# Patient Record
Sex: FEMALE | Race: WHITE | NOT HISPANIC OR LATINO | Employment: FULL TIME | URBAN - METROPOLITAN AREA
[De-identification: names, ages, dates, MRNs, and addresses within clinical notes are randomized per-mention and may not be internally consistent; named-entity substitution may affect disease eponyms.]

---

## 2017-06-01 ENCOUNTER — OFFICE VISIT (OUTPATIENT)
Dept: FAMILY MEDICINE CLINIC | Facility: CLINIC | Age: 37
End: 2017-06-01

## 2017-06-01 VITALS
SYSTOLIC BLOOD PRESSURE: 114 MMHG | OXYGEN SATURATION: 98 % | RESPIRATION RATE: 16 BRPM | BODY MASS INDEX: 40.32 KG/M2 | HEART RATE: 84 BPM | WEIGHT: 200 LBS | DIASTOLIC BLOOD PRESSURE: 70 MMHG | HEIGHT: 59 IN

## 2017-06-01 DIAGNOSIS — R10.10 PAIN OF UPPER ABDOMEN: Primary | ICD-10-CM

## 2017-06-01 LAB
ALBUMIN SERPL-MCNC: 4.3 G/DL (ref 3.2–4.8)
ALBUMIN/GLOB SERPL: 1.5 G/DL (ref 1.5–2.5)
ALP SERPL-CCNC: 65 U/L (ref 25–100)
ALT SERPL W P-5'-P-CCNC: 26 U/L (ref 7–40)
AMYLASE SERPL-CCNC: 69 U/L (ref 30–118)
ANION GAP SERPL CALCULATED.3IONS-SCNC: 2 MMOL/L (ref 3–11)
ARTICHOKE IGE QN: 193 MG/DL (ref 0–130)
AST SERPL-CCNC: 38 U/L (ref 0–33)
BASOPHILS # BLD AUTO: 0.02 10*3/MM3 (ref 0–0.2)
BASOPHILS NFR BLD AUTO: 0.3 % (ref 0–1)
BILIRUB SERPL-MCNC: 0.5 MG/DL (ref 0.3–1.2)
BUN BLD-MCNC: 9 MG/DL (ref 9–23)
BUN/CREAT SERPL: 12.9 (ref 7–25)
CALCIUM SPEC-SCNC: 10 MG/DL (ref 8.7–10.4)
CHLORIDE SERPL-SCNC: 107 MMOL/L (ref 99–109)
CHOLEST SERPL-MCNC: 247 MG/DL (ref 0–200)
CO2 SERPL-SCNC: 29 MMOL/L (ref 20–31)
CREAT BLD-MCNC: 0.7 MG/DL (ref 0.6–1.3)
DEPRECATED RDW RBC AUTO: 42 FL (ref 37–54)
EOSINOPHIL # BLD AUTO: 0.04 10*3/MM3 (ref 0.1–0.3)
EOSINOPHIL NFR BLD AUTO: 0.6 % (ref 0–3)
ERYTHROCYTE [DISTWIDTH] IN BLOOD BY AUTOMATED COUNT: 12.5 % (ref 11.3–14.5)
GFR SERPL CREATININE-BSD FRML MDRD: 94 ML/MIN/1.73
GLOBULIN UR ELPH-MCNC: 2.9 GM/DL
GLUCOSE BLD-MCNC: 78 MG/DL (ref 70–100)
HCT VFR BLD AUTO: 45 % (ref 34.5–44)
HDLC SERPL-MCNC: 52 MG/DL (ref 40–60)
HGB BLD-MCNC: 15.1 G/DL (ref 11.5–15.5)
IMM GRANULOCYTES # BLD: 0.01 10*3/MM3 (ref 0–0.03)
IMM GRANULOCYTES NFR BLD: 0.2 % (ref 0–0.6)
IRON 24H UR-MRATE: 98 MCG/DL (ref 50–175)
LIPASE SERPL-CCNC: 46 U/L (ref 6–51)
LYMPHOCYTES # BLD AUTO: 2.53 10*3/MM3 (ref 0.6–4.8)
LYMPHOCYTES NFR BLD AUTO: 40.1 % (ref 24–44)
MCH RBC QN AUTO: 30.6 PG (ref 27–31)
MCHC RBC AUTO-ENTMCNC: 33.6 G/DL (ref 32–36)
MCV RBC AUTO: 91.1 FL (ref 80–99)
MONOCYTES # BLD AUTO: 0.47 10*3/MM3 (ref 0–1)
MONOCYTES NFR BLD AUTO: 7.4 % (ref 0–12)
NEUTROPHILS # BLD AUTO: 3.24 10*3/MM3 (ref 1.5–8.3)
NEUTROPHILS NFR BLD AUTO: 51.4 % (ref 41–71)
PLATELET # BLD AUTO: 283 10*3/MM3 (ref 150–450)
PMV BLD AUTO: 12.1 FL (ref 6–12)
POTASSIUM BLD-SCNC: 4.4 MMOL/L (ref 3.5–5.5)
PROT SERPL-MCNC: 7.2 G/DL (ref 5.7–8.2)
RBC # BLD AUTO: 4.94 10*6/MM3 (ref 3.89–5.14)
SODIUM BLD-SCNC: 138 MMOL/L (ref 132–146)
TRIGL SERPL-MCNC: 92 MG/DL (ref 0–150)
TSH SERPL DL<=0.05 MIU/L-ACNC: 0.83 MIU/ML (ref 0.35–5.35)
WBC NRBC COR # BLD: 6.31 10*3/MM3 (ref 3.5–10.8)

## 2017-06-01 PROCEDURE — 36415 COLL VENOUS BLD VENIPUNCTURE: CPT | Performed by: FAMILY MEDICINE

## 2017-06-01 PROCEDURE — 80061 LIPID PANEL: CPT | Performed by: FAMILY MEDICINE

## 2017-06-01 PROCEDURE — 99214 OFFICE O/P EST MOD 30 MIN: CPT | Performed by: FAMILY MEDICINE

## 2017-06-01 PROCEDURE — 85025 COMPLETE CBC W/AUTO DIFF WBC: CPT | Performed by: FAMILY MEDICINE

## 2017-06-01 PROCEDURE — 83690 ASSAY OF LIPASE: CPT | Performed by: FAMILY MEDICINE

## 2017-06-01 PROCEDURE — 83540 ASSAY OF IRON: CPT | Performed by: FAMILY MEDICINE

## 2017-06-01 PROCEDURE — 80053 COMPREHEN METABOLIC PANEL: CPT | Performed by: FAMILY MEDICINE

## 2017-06-01 PROCEDURE — 82150 ASSAY OF AMYLASE: CPT | Performed by: FAMILY MEDICINE

## 2017-06-01 PROCEDURE — 84443 ASSAY THYROID STIM HORMONE: CPT | Performed by: FAMILY MEDICINE

## 2017-06-01 NOTE — PROGRESS NOTES
Subjective   Eun Majano is a 37 y.o. female.     Abdominal Pain   This is a recurrent problem. The current episode started more than 1 month ago. The onset quality is gradual. The problem occurs intermittently. The problem has been gradually worsening. The pain is located in the RUQ and periumbilical region. The pain is at a severity of 7/10. The pain is mild. The quality of the pain is colicky and aching. The abdominal pain radiates to the back. Associated symptoms include constipation and nausea. Pertinent negatives include no arthralgias, diarrhea, dysuria, fever, myalgias or vomiting. The pain is aggravated by eating. The pain is relieved by bowel movements. She has tried nothing for the symptoms. The treatment provided no relief. There is no history of abdominal surgery.        The following portions of the patient's history were reviewed and updated as appropriate: allergies, current medications, past family history, past medical history, past social history, past surgical history and problem list.    Review of Systems   Constitutional: Negative for appetite change, chills, diaphoresis, fatigue, fever and unexpected weight change.   HENT: Negative for congestion, ear pain, mouth sores, postnasal drip, rhinorrhea, sinus pressure, sneezing, sore throat and trouble swallowing.    Eyes: Negative for pain, redness and visual disturbance.   Respiratory: Negative for apnea, cough, chest tightness, shortness of breath and wheezing.    Cardiovascular: Negative for chest pain, palpitations and leg swelling.   Gastrointestinal: Positive for abdominal pain, constipation and nausea. Negative for abdominal distention, blood in stool, diarrhea and vomiting.   Endocrine: Negative for cold intolerance, polydipsia, polyphagia and polyuria.   Genitourinary: Negative for difficulty urinating, dysuria, enuresis, flank pain and urgency.   Musculoskeletal: Negative for arthralgias, back pain, joint swelling, myalgias and neck  pain.   Skin: Negative for color change, rash and wound.   Neurological: Negative for dizziness, syncope, weakness, light-headedness and numbness.   Hematological: Negative for adenopathy.   Psychiatric/Behavioral: Negative for agitation, behavioral problems and confusion. The patient is not nervous/anxious.        Objective   Physical Exam   Constitutional: She is oriented to person, place, and time. She appears well-developed and well-nourished. No distress.   HENT:   Right Ear: External ear normal.   Left Ear: External ear normal.   Nose: Nose normal.   Mouth/Throat: Oropharynx is clear and moist.   Eyes: Conjunctivae and EOM are normal. Pupils are equal, round, and reactive to light.   Neck: Normal range of motion. Neck supple. No thyromegaly present.   Cardiovascular: Normal rate, regular rhythm and normal heart sounds.    No murmur heard.  Pulmonary/Chest: Effort normal and breath sounds normal. No respiratory distress. She has no wheezes.   Abdominal: Soft. Bowel sounds are normal. She exhibits no distension and no mass. There is no tenderness. There is no rebound and no guarding. No hernia.   Musculoskeletal: Normal range of motion. She exhibits no edema or tenderness.   Lymphadenopathy:     She has no cervical adenopathy.   Neurological: She is alert and oriented to person, place, and time. She has normal reflexes.   Skin: Skin is warm and dry. No rash noted. She is not diaphoretic. No erythema. No pallor.   Psychiatric: She has a normal mood and affect. Her behavior is normal. Judgment and thought content normal.   Nursing note and vitals reviewed.      Assessment/Plan   Eun was seen today for back pain and abdominal pain.    Diagnoses and all orders for this visit:    Pain of upper abdomen  -     CBC & Differential  -     Comprehensive Metabolic Panel  -     Iron  -     Lipid Panel  -     TSH  -     Amylase  -     Lipase  -     US Abdomen Complete; Future  -     CBC Auto Differential        I  personally spent over half of a total 25 minutes face to face with the patient in counseling and discussion and/or coordination of care as described above.

## 2017-06-08 ENCOUNTER — HOSPITAL ENCOUNTER (OUTPATIENT)
Dept: ULTRASOUND IMAGING | Facility: HOSPITAL | Age: 37
Discharge: HOME | End: 2017-06-08
Attending: FAMILY MEDICINE | Admitting: FAMILY MEDICINE

## 2017-06-08 DIAGNOSIS — R10.10 PAIN OF UPPER ABDOMEN: ICD-10-CM

## 2017-06-08 PROCEDURE — 76700 US EXAM ABDOM COMPLETE: CPT

## 2017-06-15 ENCOUNTER — TELEPHONE (OUTPATIENT)
Dept: FAMILY MEDICINE CLINIC | Facility: CLINIC | Age: 37
End: 2017-06-15

## 2017-06-15 NOTE — TELEPHONE ENCOUNTER
----- Message from Esther Spencer DO sent at 6/13/2017  7:44 AM EDT -----  Regarding: FW: Pt rosanna   Contact: [phone]   She has an appointment 6/29. If she needs to be seen sooner than this then that is fine. She may need GI referral.  ----- Message -----     From: Ruth Mejia MA     Sent: 6/12/2017   3:28 PM       To: Esther Spencer DO  Subject: FW: Pt rosanna                                  She wants to know when she needs to follow up with the Abdominal aorta , also she states that she is still having these issues with abdominal pain , and wants to know what the next steps are .   ----- Message -----     From: Esther Spencer DO     Sent: 6/12/2017   2:07 PM       To: Ruth Mejia MA  Subject: FW: Pt rosanna                                  Very slightly enlarged abdominal aorta otherwise scan is unremarkable.   ----- Message -----     From: Ruth Mejia MA     Sent: 6/12/2017   1:36 PM       To: Esther Spencer DO  Subject: FW: Pt rosanna                                      ----- Message -----     From: Rebeka Foy     Sent: 6/12/2017   1:22 PM       To: Ruth Mejia MA  Subject: Pt rosanna                                      Pt called and wants a call to get results of and to discuss her recent Ultrasound

## 2017-11-03 ENCOUNTER — OFFICE VISIT (OUTPATIENT)
Dept: FAMILY MEDICINE CLINIC | Facility: CLINIC | Age: 37
End: 2017-11-03

## 2017-11-03 VITALS
BODY MASS INDEX: 39.31 KG/M2 | DIASTOLIC BLOOD PRESSURE: 70 MMHG | HEIGHT: 59 IN | SYSTOLIC BLOOD PRESSURE: 110 MMHG | RESPIRATION RATE: 16 BRPM | HEART RATE: 80 BPM | OXYGEN SATURATION: 98 % | WEIGHT: 195 LBS

## 2017-11-03 DIAGNOSIS — J06.9 ACUTE URI: Primary | ICD-10-CM

## 2017-11-03 DIAGNOSIS — J40 BRONCHITIS: ICD-10-CM

## 2017-11-03 PROCEDURE — 99213 OFFICE O/P EST LOW 20 MIN: CPT | Performed by: FAMILY MEDICINE

## 2017-11-03 PROCEDURE — 94010 BREATHING CAPACITY TEST: CPT | Performed by: FAMILY MEDICINE

## 2017-11-03 RX ORDER — ALBUTEROL SULFATE 90 UG/1
2 AEROSOL, METERED RESPIRATORY (INHALATION) EVERY 4 HOURS PRN
Qty: 1 INHALER | Refills: 1 | Status: SHIPPED | OUTPATIENT
Start: 2017-11-03 | End: 2018-01-05

## 2017-11-03 RX ORDER — AZITHROMYCIN 250 MG/1
TABLET, FILM COATED ORAL
Qty: 6 TABLET | Refills: 0 | Status: SHIPPED | OUTPATIENT
Start: 2017-11-03 | End: 2017-11-17

## 2017-11-03 RX ORDER — GUAIFENESIN AND CODEINE PHOSPHATE 100; 10 MG/5ML; MG/5ML
5 SOLUTION ORAL 3 TIMES DAILY PRN
Qty: 118 ML | Refills: 0 | Status: SHIPPED | OUTPATIENT
Start: 2017-11-03 | End: 2018-01-05

## 2017-11-03 RX ORDER — AZELASTINE 1 MG/ML
2 SPRAY, METERED NASAL 2 TIMES DAILY
Qty: 1 EACH | Refills: 2 | Status: SHIPPED | OUTPATIENT
Start: 2017-11-03 | End: 2018-01-05

## 2017-11-03 NOTE — PROGRESS NOTES
Subjective   Eun Majano is a 37 y.o. female.     Cough   This is a recurrent problem. The current episode started more than 1 month ago. The problem has been unchanged. The problem occurs constantly. Associated symptoms include nasal congestion, postnasal drip, rhinorrhea, shortness of breath and wheezing. Pertinent negatives include no fever, sore throat or weight loss. Nothing aggravates the symptoms. 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 Treatments tried: nasal decongestants and albuterol. The treatment provided mild relief. Her past medical history is significant for asthma.        The following portions of the patient's history were reviewed and updated as appropriate: allergies, current medications, past family history, past medical history, past social history, past surgical history and problem list.    Review of Systems   Constitutional: Negative.  Negative for fever and weight loss.   HENT: Positive for postnasal drip and rhinorrhea. Negative for sore throat.    Eyes: Negative.    Respiratory: Positive for cough, shortness of breath and wheezing.    Cardiovascular: Negative.    Gastrointestinal: Negative.    Endocrine: Negative.    Genitourinary: Negative.    Musculoskeletal: Negative.    Skin: Negative.    Allergic/Immunologic: Negative.    Neurological: Negative.    Hematological: Negative.    Psychiatric/Behavioral: Negative.    All other systems reviewed and are negative.      Objective   Physical Exam   Constitutional: She is oriented to person, place, and time. She appears well-developed and well-nourished. No distress.   HENT:   Right Ear: External ear normal.   Left Ear: External ear normal.   Nose: Nose normal.   Mouth/Throat: Oropharynx is clear and moist.   Eyes: Conjunctivae and EOM are normal. Pupils are equal, round, and reactive to light.   Neck: Normal range of motion. Neck supple. No thyromegaly present.   Cardiovascular: Normal rate, regular rhythm and normal heart sounds.    No murmur  heard.  Pulmonary/Chest: Effort normal and breath sounds normal. No respiratory distress. She has no wheezes.   Abdominal: Soft. Bowel sounds are normal. She exhibits no distension and no mass. There is no tenderness. There is no rebound and no guarding. No hernia.   Musculoskeletal: Normal range of motion. She exhibits no edema or tenderness.   Lymphadenopathy:     She has no cervical adenopathy.   Neurological: She is alert and oriented to person, place, and time. She has normal reflexes.   Skin: Skin is warm and dry. No rash noted. She is not diaphoretic. No erythema. No pallor.   Psychiatric: She has a normal mood and affect. Her behavior is normal. Judgment and thought content normal.   Nursing note and vitals reviewed.  Spirometry: %, FEV1 108%, small airways normal    Assessment/Plan   Eun was seen today for cough and uri.    Diagnoses and all orders for this visit:    Acute URI    Bronchitis  -     Breathing Capacity Test    Other orders  -     azithromycin (ZITHROMAX) 250 MG tablet; Take 2 tablets the first day, then 1 tablet daily for 4 days.  -     albuterol (PROVENTIL HFA;VENTOLIN HFA) 108 (90 Base) MCG/ACT inhaler; Inhale 2 puffs Every 4 (Four) Hours As Needed for Wheezing.  -     guaifenesin-codeine (GUAIFENESIN AC) 100-10 MG/5ML liquid; Take 5 mL by mouth 3 (Three) Times a Day As Needed for Cough.  -     azelastine (ASTELIN) 0.1 % nasal spray; 2 sprays into each nostril 2 (Two) Times a Day. Use in each nostril as directed        I personally spent over half of a total 25 minutes face to face with the patient in counseling and discussion and/or coordination of care as described above.

## 2017-11-17 ENCOUNTER — OFFICE VISIT (OUTPATIENT)
Dept: FAMILY MEDICINE CLINIC | Facility: CLINIC | Age: 37
End: 2017-11-17

## 2017-11-17 VITALS
OXYGEN SATURATION: 98 % | HEIGHT: 59 IN | TEMPERATURE: 98.6 F | DIASTOLIC BLOOD PRESSURE: 80 MMHG | BODY MASS INDEX: 39.72 KG/M2 | HEART RATE: 62 BPM | WEIGHT: 197 LBS | RESPIRATION RATE: 18 BRPM | SYSTOLIC BLOOD PRESSURE: 120 MMHG

## 2017-11-17 DIAGNOSIS — R05.9 COUGH: ICD-10-CM

## 2017-11-17 DIAGNOSIS — J30.2 CHRONIC SEASONAL ALLERGIC RHINITIS, UNSPECIFIED TRIGGER: ICD-10-CM

## 2017-11-17 DIAGNOSIS — J20.9 ACUTE BRONCHITIS, UNSPECIFIED ORGANISM: Primary | ICD-10-CM

## 2017-11-17 PROCEDURE — 99213 OFFICE O/P EST LOW 20 MIN: CPT | Performed by: PHYSICIAN ASSISTANT

## 2017-11-17 RX ORDER — LEVOFLOXACIN 500 MG/1
500 TABLET, FILM COATED ORAL DAILY
Qty: 7 TABLET | Refills: 0 | Status: SHIPPED | OUTPATIENT
Start: 2017-11-17 | End: 2018-01-05

## 2017-11-17 RX ORDER — MONTELUKAST SODIUM 10 MG/1
10 TABLET ORAL NIGHTLY
Qty: 30 TABLET | Refills: 5 | Status: SHIPPED | OUTPATIENT
Start: 2017-11-17 | End: 2018-05-09

## 2017-11-17 RX ORDER — PREDNISONE 10 MG/1
TABLET ORAL
Qty: 21 TABLET | Refills: 0 | Status: SHIPPED | OUTPATIENT
Start: 2017-11-17 | End: 2018-01-05

## 2017-11-17 NOTE — PROGRESS NOTES
Subjective   Eun Majano is a 37 y.o. female.     Cough   This is a new problem. The current episode started more than 1 month ago. The problem has been unchanged. The problem occurs every few minutes. The cough is productive of sputum. Associated symptoms include ear congestion, headaches, nasal congestion, postnasal drip, rhinorrhea, a sore throat and wheezing. Pertinent negatives include no chest pain, chills, ear pain, fever, heartburn, hemoptysis, myalgias, rash, shortness of breath, sweats or weight loss. The symptoms are aggravated by lying down. She has tried a beta-agonist inhaler, OTC cough suppressant, prescription cough suppressant and rest (cough and cold prep ) for the symptoms. The treatment provided no relief. Her past medical history is significant for environmental allergies. possible asthma history    bad seasonal allergies . Taken zyrtec daily X 3 years.   Albuterol inhaler does seem to help some with cough  Cough worse when laying down at night, but happens all day   Sinuses still bothering her a lot   Azithromycin did not help (was given this two weeks ago)   Travels to many different schools. Some have black mold problems.     The following portions of the patient's history were reviewed and updated as appropriate: allergies, current medications, past family history, past medical history, past social history, past surgical history and problem list.    Review of Systems   Constitutional: Positive for fatigue. Negative for chills, diaphoresis, fever and weight loss.   HENT: Positive for postnasal drip, rhinorrhea, sinus pressure, sore throat and tinnitus. Negative for congestion, ear discharge, ear pain, hearing loss, nosebleeds, sinus pain and sneezing.    Eyes: Negative.    Respiratory: Positive for cough and wheezing. Negative for hemoptysis, chest tightness and shortness of breath.    Cardiovascular: Negative.  Negative for chest pain, palpitations and leg swelling.   Gastrointestinal:  "Negative for abdominal distention, abdominal pain, anal bleeding, blood in stool, constipation, diarrhea, heartburn, nausea, rectal pain and vomiting.   Endocrine: Negative.  Negative for cold intolerance, heat intolerance, polydipsia, polyphagia and polyuria.   Genitourinary: Negative.  Negative for difficulty urinating, dysuria, flank pain, frequency, hematuria and urgency.   Musculoskeletal: Negative.  Negative for arthralgias, back pain, gait problem, joint swelling, myalgias, neck pain and neck stiffness.   Skin: Negative.  Negative for color change, pallor, rash and wound.   Allergic/Immunologic: Positive for environmental allergies. Negative for immunocompromised state.   Neurological: Positive for headaches. Negative for dizziness, syncope, weakness, light-headedness and numbness.   Hematological: Negative.  Negative for adenopathy. Does not bruise/bleed easily.   Psychiatric/Behavioral: Negative.  Negative for behavioral problems, confusion, self-injury, sleep disturbance and suicidal ideas. The patient is not nervous/anxious.        Objective    Blood pressure 120/80, pulse 62, temperature 98.6 °F (37 °C), temperature source Oral, resp. rate 18, height 59\" (149.9 cm), weight 197 lb (89.4 kg), last menstrual period 11/07/2017, SpO2 98 %, unknown if currently breastfeeding.     Physical Exam   Constitutional: She is oriented to person, place, and time. She appears well-developed and well-nourished.   HENT:   Head: Normocephalic and atraumatic.   Right Ear: External ear and ear canal normal. Tympanic membrane is retracted. Tympanic membrane is not perforated, not erythematous and not bulging.   Left Ear: External ear and ear canal normal. Tympanic membrane is retracted. Tympanic membrane is not perforated, not erythematous and not bulging.   Nose: Mucosal edema and rhinorrhea present. Right sinus exhibits no maxillary sinus tenderness and no frontal sinus tenderness. Left sinus exhibits no maxillary sinus " tenderness and no frontal sinus tenderness.   Mouth/Throat: Posterior oropharyngeal erythema present. No oropharyngeal exudate or posterior oropharyngeal edema.   Eyes: Conjunctivae and EOM are normal. Pupils are equal, round, and reactive to light.   Neck: Normal range of motion. Neck supple. No tracheal deviation present. No thyromegaly present.   Cardiovascular: Normal rate, regular rhythm, normal heart sounds and intact distal pulses.  Exam reveals no gallop and no friction rub.    No murmur heard.  Pulmonary/Chest: Effort normal and breath sounds normal. No respiratory distress. She has no wheezes. She has no rales. She exhibits no tenderness.   Abdominal: Soft. Bowel sounds are normal. She exhibits no distension and no mass. There is no tenderness. There is no rebound and no guarding. No hernia.   Musculoskeletal: Normal range of motion. She exhibits no edema, tenderness or deformity.   Lymphadenopathy:     She has no cervical adenopathy.   Neurological: She is alert and oriented to person, place, and time. She has normal reflexes.   Skin: Skin is warm and dry.   Psychiatric: She has a normal mood and affect. Her behavior is normal. Judgment and thought content normal.   Nursing note and vitals reviewed.      Assessment/Plan   Eun was seen today for follow-up.    Diagnoses and all orders for this visit:    Acute bronchitis, unspecified organism  -     predniSONE (DELTASONE) 10 MG tablet; Take 60 mg po day 1, 50 mg po day 2, 40 mg po day 3, 30 mg po day 4, 20 mg po day 5, then take 10 mg po day 6  -     levoFLOXacin (LEVAQUIN) 500 MG tablet; Take 1 tablet by mouth Daily.    Cough  -     montelukast (SINGULAIR) 10 MG tablet; Take 1 tablet by mouth Every Night.    Chronic seasonal allergic rhinitis, unspecified trigger    believe symptoms are more related to environmental allergens or possible underlying asthma. Steroid taper and Singulair as directed. May continue astelin and albuterol inhaler. Encourage her  to try switching from zyrtec to Claritin, allegra or xyzal since she has been on zyrtec for many years with little relief.   Hold off on antibiotic at this time. May begin in the next 6 days if symptoms not improving with conservative approaches. Pt agrees.   If still no improvement, suggest trial of PPI in the future to rule out occult reflux     Pt has questions about colonoscopy. No family hx of colon cancer or polyps but considering a colonoscopy. Had right sided flank pain for months, nothing showed up on images. Will also get constipation from time to time. Encouraged patient to discuss this with her PCP

## 2018-01-05 ENCOUNTER — OFFICE VISIT (OUTPATIENT)
Dept: FAMILY MEDICINE CLINIC | Facility: CLINIC | Age: 38
End: 2018-01-05

## 2018-01-05 VITALS
HEART RATE: 71 BPM | BODY MASS INDEX: 39.72 KG/M2 | DIASTOLIC BLOOD PRESSURE: 70 MMHG | SYSTOLIC BLOOD PRESSURE: 110 MMHG | TEMPERATURE: 99.3 F | WEIGHT: 197 LBS | OXYGEN SATURATION: 96 % | HEIGHT: 59 IN | RESPIRATION RATE: 16 BRPM

## 2018-01-05 DIAGNOSIS — R07.9 CHEST PAIN, UNSPECIFIED TYPE: Primary | ICD-10-CM

## 2018-01-05 PROCEDURE — 99214 OFFICE O/P EST MOD 30 MIN: CPT | Performed by: NURSE PRACTITIONER

## 2018-01-05 PROCEDURE — 93000 ELECTROCARDIOGRAM COMPLETE: CPT | Performed by: NURSE PRACTITIONER

## 2018-01-05 NOTE — PROGRESS NOTES
Subjective   Eun Majano is a 37 y.o. female.     History of Present Illness Ms Majano complains of worsening chest pain for one week. She was instructed to go to the ER when she called for an appt but chose to come to the office instead. States over the last 6 months had two earlier episodes of chest pain. Described as tightening and pressure like sensation in central chest that radiated to her right arm. Denies palpitations, sob, nausea, diaphoresis or pre-syncope. Not aggravated by exercise or food. Not alleviated by TUMS or rest. Pain can be 7/10. Was able to sleep last night but woke up this am with pain 4/10. No new repetitive motions. She does have two children.   Risk factors include obesity, sedentary lifestyle and hyperlipidemia.    The following portions of the patient's history were reviewed and updated as appropriate: allergies, current medications, past family history, past medical history, past social history, past surgical history and problem list.    Review of Systems   Constitutional: Negative for fatigue, fever and unexpected weight change.   HENT: Negative for congestion, hearing loss, nosebleeds, rhinorrhea, sore throat, trouble swallowing and voice change.    Eyes: Negative for pain, discharge, redness and visual disturbance.   Respiratory: Negative for cough, chest tightness, shortness of breath and wheezing.    Cardiovascular: Positive for chest pain. Negative for palpitations and leg swelling.   Gastrointestinal: Negative for abdominal distention, abdominal pain, anal bleeding, blood in stool, constipation, diarrhea, nausea and vomiting.   Endocrine: Negative for cold intolerance, heat intolerance, polydipsia, polyphagia and polyuria.   Genitourinary: Negative for dysuria, flank pain, frequency and hematuria.   Musculoskeletal: Negative for arthralgias, gait problem, joint swelling and myalgias.   Skin: Negative for color change and rash.   Neurological: Negative for dizziness,  tremors, seizures, syncope, speech difficulty, weakness, numbness and headaches.   Hematological: Negative.    Psychiatric/Behavioral: Negative.        Objective   Physical Exam   Constitutional: She is oriented to person, place, and time. She appears well-developed and well-nourished. No distress.   HENT:   Head: Normocephalic and atraumatic.   Right Ear: Tympanic membrane and external ear normal.   Left Ear: Tympanic membrane and external ear normal.   Nose: Nose normal.   Mouth/Throat: Oropharynx is clear and moist. No oropharyngeal exudate.   Eyes: Conjunctivae are normal. Pupils are equal, round, and reactive to light. Right eye exhibits no discharge. Left eye exhibits no discharge. No scleral icterus.   Neck: Neck supple. No tracheal deviation present. No thyromegaly present.   Cardiovascular: Normal rate, regular rhythm and normal heart sounds.  Exam reveals no gallop and no friction rub.    No murmur heard.  Cannot reproduce her chest pain with palpation   Pulmonary/Chest: Effort normal and breath sounds normal. No respiratory distress. She has no wheezes.   Abdominal: Soft. Bowel sounds are normal. She exhibits no distension and no mass. There is no tenderness.   Musculoskeletal: She exhibits no edema or deformity.   Lymphadenopathy:     She has no cervical adenopathy.   Neurological: She is alert and oriented to person, place, and time. Coordination normal.   Skin: Skin is warm and dry. No rash noted. No erythema.   Psychiatric: She has a normal mood and affect. Her speech is normal and behavior is normal. Judgment and thought content normal.   Nursing note and vitals reviewed.        ECG 12 Lead  Date/Time: 1/5/2018 12:36 PM  Performed by: STEVEN MUÑOZ  Authorized by: STEVEN MUÑOZ   Comparison: not compared with previous ECG   Previous ECG: no previous ECG available  Rhythm: sinus rhythm  Rate: normal  Conduction: conduction normal  ST Segments: ST segments normal  T Waves: T waves  normal  QRS axis: normal  Clinical impression: normal ECG  Comments: Poor quality with artifact            Eun was seen today for chest pain.    Diagnoses and all orders for this visit:    Chest pain, unspecified type    Other orders  -     Cancel: Tdap Vaccine Greater Than or Equal To 6yo IM    Discussed that she needed to be evaluated in ER with serial troponins and ekg's. Patient chose to drive herself to [facility] ER, did not want to go by EMS. Report called to [facility] ER. Will have her follow up with PCP after ER visit. Anticipate stress test.  I personally spent over half of a total 25 minutes face to face with the patient in counseling and discussion and/or coordination of care as described above.

## 2018-05-09 ENCOUNTER — OFFICE VISIT (OUTPATIENT)
Dept: FAMILY MEDICINE CLINIC | Facility: CLINIC | Age: 38
End: 2018-05-09

## 2018-05-09 ENCOUNTER — TELEPHONE (OUTPATIENT)
Dept: FAMILY MEDICINE CLINIC | Facility: CLINIC | Age: 38
End: 2018-05-09

## 2018-05-09 VITALS
RESPIRATION RATE: 18 BRPM | BODY MASS INDEX: 39.51 KG/M2 | OXYGEN SATURATION: 97 % | DIASTOLIC BLOOD PRESSURE: 74 MMHG | SYSTOLIC BLOOD PRESSURE: 118 MMHG | WEIGHT: 196 LBS | HEART RATE: 71 BPM | HEIGHT: 59 IN

## 2018-05-09 DIAGNOSIS — Z00.00 HEALTHCARE MAINTENANCE: Primary | ICD-10-CM

## 2018-05-09 DIAGNOSIS — L73.9 FOLLICULITIS: ICD-10-CM

## 2018-05-09 DIAGNOSIS — E78.2 MIXED HYPERLIPIDEMIA: ICD-10-CM

## 2018-05-09 LAB
ALBUMIN SERPL-MCNC: 4.2 G/DL (ref 3.2–4.8)
ALBUMIN/GLOB SERPL: 1.6 G/DL (ref 1.5–2.5)
ALP SERPL-CCNC: 59 U/L (ref 25–100)
ALT SERPL W P-5'-P-CCNC: 19 U/L (ref 7–40)
ANION GAP SERPL CALCULATED.3IONS-SCNC: 7 MMOL/L (ref 3–11)
ARTICHOKE IGE QN: 157 MG/DL (ref 0–130)
AST SERPL-CCNC: 20 U/L (ref 0–33)
BASOPHILS # BLD AUTO: 0.05 10*3/MM3 (ref 0–0.2)
BASOPHILS NFR BLD AUTO: 0.9 % (ref 0–1)
BILIRUB SERPL-MCNC: 0.5 MG/DL (ref 0.3–1.2)
BUN BLD-MCNC: 11 MG/DL (ref 9–23)
BUN/CREAT SERPL: 15.7 (ref 7–25)
CALCIUM SPEC-SCNC: 9.1 MG/DL (ref 8.7–10.4)
CHLORIDE SERPL-SCNC: 107 MMOL/L (ref 99–109)
CHOLEST SERPL-MCNC: 211 MG/DL (ref 0–200)
CO2 SERPL-SCNC: 24 MMOL/L (ref 20–31)
CREAT BLD-MCNC: 0.7 MG/DL (ref 0.6–1.3)
DEPRECATED RDW RBC AUTO: 41.6 FL (ref 37–54)
EOSINOPHIL # BLD AUTO: 0.08 10*3/MM3 (ref 0–0.3)
EOSINOPHIL NFR BLD AUTO: 1.5 % (ref 0–3)
ERYTHROCYTE [DISTWIDTH] IN BLOOD BY AUTOMATED COUNT: 12.8 % (ref 11.3–14.5)
GFR SERPL CREATININE-BSD FRML MDRD: 94 ML/MIN/1.73
GLOBULIN UR ELPH-MCNC: 2.6 GM/DL
GLUCOSE BLD-MCNC: 83 MG/DL (ref 70–100)
HBA1C MFR BLD: 4.9 %
HCT VFR BLD AUTO: 43.8 % (ref 34.5–44)
HDLC SERPL-MCNC: 50 MG/DL (ref 40–60)
HGB BLD-MCNC: 14.8 G/DL (ref 11.5–15.5)
IMM GRANULOCYTES # BLD: 0.01 10*3/MM3 (ref 0–0.03)
IMM GRANULOCYTES NFR BLD: 0.2 % (ref 0–0.6)
LYMPHOCYTES # BLD AUTO: 2.74 10*3/MM3 (ref 0.6–4.8)
LYMPHOCYTES NFR BLD AUTO: 50 % (ref 24–44)
MCH RBC QN AUTO: 30.1 PG (ref 27–31)
MCHC RBC AUTO-ENTMCNC: 33.8 G/DL (ref 32–36)
MCV RBC AUTO: 89.2 FL (ref 80–99)
MONOCYTES # BLD AUTO: 0.46 10*3/MM3 (ref 0–1)
MONOCYTES NFR BLD AUTO: 8.4 % (ref 0–12)
NEUTROPHILS # BLD AUTO: 2.15 10*3/MM3 (ref 1.5–8.3)
NEUTROPHILS NFR BLD AUTO: 39.2 % (ref 41–71)
PLATELET # BLD AUTO: 299 10*3/MM3 (ref 150–450)
PMV BLD AUTO: 11.9 FL (ref 6–12)
POTASSIUM BLD-SCNC: 4.5 MMOL/L (ref 3.5–5.5)
PROT SERPL-MCNC: 6.8 G/DL (ref 5.7–8.2)
RBC # BLD AUTO: 4.91 10*6/MM3 (ref 3.89–5.14)
SODIUM BLD-SCNC: 138 MMOL/L (ref 132–146)
TRIGL SERPL-MCNC: 116 MG/DL (ref 0–150)
TSH SERPL DL<=0.05 MIU/L-ACNC: 0.76 MIU/ML (ref 0.35–5.35)
WBC NRBC COR # BLD: 5.48 10*3/MM3 (ref 3.5–10.8)

## 2018-05-09 PROCEDURE — 84443 ASSAY THYROID STIM HORMONE: CPT | Performed by: FAMILY MEDICINE

## 2018-05-09 PROCEDURE — 80061 LIPID PANEL: CPT | Performed by: FAMILY MEDICINE

## 2018-05-09 PROCEDURE — 99214 OFFICE O/P EST MOD 30 MIN: CPT | Performed by: FAMILY MEDICINE

## 2018-05-09 PROCEDURE — 83036 HEMOGLOBIN GLYCOSYLATED A1C: CPT | Performed by: FAMILY MEDICINE

## 2018-05-09 PROCEDURE — 36415 COLL VENOUS BLD VENIPUNCTURE: CPT | Performed by: FAMILY MEDICINE

## 2018-05-09 PROCEDURE — 85025 COMPLETE CBC W/AUTO DIFF WBC: CPT | Performed by: FAMILY MEDICINE

## 2018-05-09 PROCEDURE — 80053 COMPREHEN METABOLIC PANEL: CPT | Performed by: FAMILY MEDICINE

## 2018-05-09 RX ORDER — SULFAMETHOXAZOLE AND TRIMETHOPRIM 800; 160 MG/1; MG/1
TABLET ORAL
Refills: 0 | COMMUNITY
Start: 2018-05-04 | End: 2018-10-01

## 2018-05-09 RX ORDER — MUPIROCIN CALCIUM 20 MG/G
CREAM TOPICAL 3 TIMES DAILY
Qty: 30 G | Refills: 1 | Status: SHIPPED | OUTPATIENT
Start: 2018-05-09 | End: 2018-10-01

## 2018-05-09 NOTE — TELEPHONE ENCOUNTER
PHARMACY CALLED TO SEE IF THEY COULD CHANGE THE mupirocin (BACTROBAN) 2 % cream TO THE OINTMENT DUE TO HAVING IT IN STOCK.     PLEASE ADVISE.     CALL BRIGITTE IF THIS IS OKAY.

## 2018-05-09 NOTE — PROGRESS NOTES
Subjective   Eun Majano is a 38 y.o. female.     History of Present Illness   Pt having recurrent boils on hands and legs and abdomen. Has been to [facility] and given Bactrim with little relief.Pt works at [employer] but has to go in to schools everyday and concerned that she may be coming in contact with something.  Pt concerned that she needs checked for diabetes. The following portions of the patient's history were reviewed and updated as appropriate: allergies, current medications, past family history, past medical history, past social history, past surgical history and problem list.    Review of Systems   Constitutional: Negative.  Negative for activity change, fatigue, fever, unexpected weight gain and unexpected weight loss.   HENT: Negative.  Negative for congestion, sneezing and sore throat.    Eyes: Negative.  Negative for blurred vision, double vision and visual disturbance.   Respiratory: Negative.  Negative for cough, chest tightness, shortness of breath and wheezing.    Cardiovascular: Negative.  Negative for chest pain, palpitations and leg swelling.   Gastrointestinal: Negative.  Negative for abdominal distention, abdominal pain, blood in stool, constipation, diarrhea and nausea.   Endocrine: Negative.  Negative for cold intolerance and heat intolerance.   Genitourinary: Negative.  Negative for urinary incontinence, dysuria, frequency and urgency.   Musculoskeletal: Negative.  Negative for arthralgias and myalgias.   Skin: Negative.  Negative for rash.   Allergic/Immunologic: Negative.    Neurological: Negative.  Negative for dizziness, syncope, numbness and memory problem.   Hematological: Negative.  Negative for adenopathy.   Psychiatric/Behavioral: Negative.  Negative for suicidal ideas and depressed mood. The patient is not nervous/anxious.    All other systems reviewed and are negative.      Objective   Physical Exam   Constitutional: She is oriented to person, place, and time. She appears  well-developed and well-nourished.   HENT:   Head: Normocephalic.   Right Ear: External ear normal.   Left Ear: External ear normal.   Nose: Nose normal.   Mouth/Throat: Oropharynx is clear and moist. No oropharyngeal exudate.   Eyes: Conjunctivae and EOM are normal. Pupils are equal, round, and reactive to light.   Neck: Normal range of motion. Neck supple. No thyromegaly present.   Cardiovascular: Normal rate, regular rhythm, normal heart sounds and intact distal pulses.    No murmur heard.  Pulmonary/Chest: Effort normal and breath sounds normal. No respiratory distress. She exhibits no tenderness.   Abdominal: Soft. Bowel sounds are normal. She exhibits no distension and no mass. There is no tenderness. There is no rebound and no guarding.   Musculoskeletal: Normal range of motion.   Lymphadenopathy:     She has no cervical adenopathy.   Neurological: She is alert and oriented to person, place, and time. She has normal reflexes. She displays normal reflexes. She exhibits normal muscle tone. Coordination normal.   Skin: Skin is warm and dry. No rash noted. She is not diaphoretic. No erythema.   2 areas of infected boils on abdomen   Psychiatric: She has a normal mood and affect. Her behavior is normal. Judgment and thought content normal.   Nursing note and vitals reviewed.        Assessment/Plan   Eun was seen today for labs only.    Diagnoses and all orders for this visit:    Healthcare maintenance  -     POC Glycosylated Hemoglobin (Hb A1C)    Folliculitis  -     Ambulatory Referral to Dermatology    Mixed hyperlipidemia  -     CBC & Differential  -     Comprehensive Metabolic Panel  -     Lipid Panel  -     TSH  -     CBC Auto Differential    Other orders  -     mupirocin (BACTROBAN) 2 % cream; Apply  topically 3 (Three) Times a Day.    I personally spent over half of a total 25 minutes face to face with the patient in counseling and discussion and/or coordination of care as described above.

## 2018-10-01 ENCOUNTER — OFFICE VISIT (OUTPATIENT)
Dept: FAMILY MEDICINE CLINIC | Facility: CLINIC | Age: 38
End: 2018-10-01

## 2018-10-01 VITALS
HEART RATE: 78 BPM | HEIGHT: 59 IN | OXYGEN SATURATION: 98 % | SYSTOLIC BLOOD PRESSURE: 120 MMHG | DIASTOLIC BLOOD PRESSURE: 72 MMHG | WEIGHT: 196 LBS | BODY MASS INDEX: 39.51 KG/M2 | RESPIRATION RATE: 16 BRPM

## 2018-10-01 DIAGNOSIS — Z00.00 HEALTH CARE MAINTENANCE: Primary | ICD-10-CM

## 2018-10-01 DIAGNOSIS — K64.8 OTHER HEMORRHOIDS: ICD-10-CM

## 2018-10-01 PROCEDURE — 86787 VARICELLA-ZOSTER ANTIBODY: CPT | Performed by: FAMILY MEDICINE

## 2018-10-01 PROCEDURE — 86762 RUBELLA ANTIBODY: CPT | Performed by: FAMILY MEDICINE

## 2018-10-01 PROCEDURE — 99395 PREV VISIT EST AGE 18-39: CPT | Performed by: FAMILY MEDICINE

## 2018-10-01 PROCEDURE — 86317 IMMUNOASSAY INFECTIOUS AGENT: CPT | Performed by: FAMILY MEDICINE

## 2018-10-01 PROCEDURE — 86735 MUMPS ANTIBODY: CPT | Performed by: FAMILY MEDICINE

## 2018-10-01 PROCEDURE — 86765 RUBEOLA ANTIBODY: CPT | Performed by: FAMILY MEDICINE

## 2018-10-01 RX ORDER — BENZONATATE 100 MG/1
100 CAPSULE ORAL 3 TIMES DAILY PRN
Qty: 30 CAPSULE | Refills: 0 | Status: SHIPPED | OUTPATIENT
Start: 2018-10-01 | End: 2018-12-27

## 2018-10-01 NOTE — PROGRESS NOTES
"Edwige Majano is a 38 y.o. female and is here for a comprehensive physical exam. The patient reports no problems. Will be starting a new job and needs titers checked for MMR, Varicella, and Hep B.    Last health maintenance visit was 1 year . Overall they feel their health is good . Lives with spouse and children.  Occupation is . Patient's diet is in general, a \"healthy\" diet  . Exercises regularly regularly 3 times weekly cardio and strength straining . Tobacco use Never used. Alcohol use is social drinker . Illicit drug no history of illicit drug use    Screening Tests  Vision Impairment. No Vision Impairment   Hearingnormal  Dental: Brushes does teeth twice a day . Dental exam every six months?yes  Mammogram up to date no  Pap smear yes per GYN  Colonoscopy n/a  Dexa Scan n/a    Do you take any herbs or supplements that were not prescribed by a doctor? no  Are you taking calcium supplements? no  Are you taking aspirin daily? no  Family history of ovarian cancer? no  Family history of breast cancer? no  FH of endometrial cancer? no  FH of cervical cancer? no  FH of colon cancer? no       History:  LMP:18  Cycles are regular yes  Duration 5 days  Clots no  Pain no  Last pap date:   Abnormal pap? no  : 4  Para: 2      Immunization History  Tdap? yes  HPV? not applicable  Pneumonia? not applicable  Shingles? not applicable    The following portions of the patient's history were reviewed and updated as appropriate: allergies, current medications, past family history, past medical history, past social history, past surgical history and problem list.    Past Medical History:   Diagnosis Date   • Abnormal Pap smear of cervix     HPV   • Fibroid     RESOLVED LAST PREGNANCY   • Recurrent pregnancy loss, antepartum condition or complication     X 3       Family History   Problem Relation Age of Onset   • No Known Problems Mother    • Cancer Father         bladder       Past " Surgical History:   Procedure Laterality Date   • HEMORRHOIDECTOMY     • TONSILLECTOMY     • WISDOM TOOTH EXTRACTION         Social History     Social History   • Marital status:      Spouse name: N/A   • Number of children: N/A   • Years of education: N/A     Occupational History   • Not on file.     Social History Main Topics   • Smoking status: Never Smoker   • Smokeless tobacco: Never Used   • Alcohol use No   • Drug use: No   • Sexual activity: Defer     Other Topics Concern   • Not on file     Social History Narrative   • No narrative on file       Review of Systems  Do you have pain that bothers you in your daily life? no  Review of Systems   Constitutional: Negative.  Negative for activity change, fatigue, fever and unexpected weight change.   HENT: Negative.  Negative for congestion, sneezing and sore throat.    Eyes: Negative.  Negative for visual disturbance.   Respiratory: Negative.  Negative for cough, chest tightness, shortness of breath and wheezing.    Cardiovascular: Negative.  Negative for chest pain, palpitations and leg swelling.   Gastrointestinal: Negative.  Negative for abdominal distention, abdominal pain, blood in stool, constipation, diarrhea and nausea.   Endocrine: Negative.  Negative for cold intolerance and heat intolerance.   Genitourinary: Negative.  Negative for dysuria, frequency and urgency.   Musculoskeletal: Negative.  Negative for arthralgias and myalgias.   Skin: Negative.  Negative for rash.   Allergic/Immunologic: Negative.    Neurological: Negative.  Negative for dizziness, syncope and numbness.   Hematological: Negative.  Negative for adenopathy.   Psychiatric/Behavioral: Negative.  Negative for suicidal ideas. The patient is not nervous/anxious.        Objective   Physical Exam   Constitutional: She is oriented to person, place, and time. She appears well-developed and well-nourished. No distress.   HENT:   Right Ear: External ear normal.   Left Ear: External ear  normal.   Nose: Nose normal.   Mouth/Throat: Oropharynx is clear and moist.   Eyes: Pupils are equal, round, and reactive to light. Conjunctivae and EOM are normal.   Neck: Normal range of motion. Neck supple. No thyromegaly present.   Cardiovascular: Normal rate, regular rhythm and normal heart sounds.    No murmur heard.  Pulmonary/Chest: Effort normal and breath sounds normal. No respiratory distress. She has no wheezes.   Abdominal: Soft. Bowel sounds are normal. She exhibits no distension and no mass. There is no tenderness. There is no rebound and no guarding. No hernia.   Musculoskeletal: Normal range of motion. She exhibits no edema or tenderness.   Lymphadenopathy:     She has no cervical adenopathy.   Neurological: She is alert and oriented to person, place, and time. She has normal reflexes.   Skin: Skin is warm and dry. No rash noted. She is not diaphoretic. No erythema. No pallor.   Psychiatric: She has a normal mood and affect. Her behavior is normal. Judgment and thought content normal.   Nursing note and vitals reviewed.       Diagnoses and all orders for this visit:    Health care maintenance  -     Hepatitis B Surf Antibody Quant  -     Measles / Mumps / Rubella Immunity  -     Varicella Zoster Antibody, IgG    Other hemorrhoids  -     Ambulatory Referral to Colorectal Surgery    Other orders  -     benzonatate (TESSALON PERLES) 100 MG capsule; Take 1 capsule by mouth 3 (Three) Times a Day As Needed for Cough.         Patient Counseling:   --BMI: Discussed that it is not acceptable and appropriate                 Plan.  --Nutrition: Stressed importance of moderation in sodium/caffeine intake, saturated fat and cholesterol, caloric balance, sufficient intake of fresh fruits, vegetables, fiber, calcium, iron, and 1 mg of folate supplement per day (for females capable of pregnancy).  ---Exercise: Stressed the importance of regular exercise.   --Substance Abuse: Discussed cessation/primary prevention  of tobacco, alcohol, or other drug use; driving or other dangerous activities under the influence; availability of treatment for abuse.    --Sexuality: Discussed sexually transmitted diseases, partner selection, use of condoms, avoidance of unintended pregnancy  and contraceptive alternatives.   --Injury prevention: Discussed safety belts, safety helmets, smoke detector, smoking near bedding or upholstery.   --Dental health: Discussed importance of regular tooth brushing, flossing, and dental visits.  --Immunizations reviewed.  --Discussed benefits of mammogram  --Discussed benefits of screening colonoscopy.  --After hours service discussed with patient    Discussed the nature of the disease including, risks, complications, implications, management, safe and proper use of medications. Encouraged therapeutic lifestyle changes including healthy diet with plenty of fruits and vegetables, daily exercise, medication compliance, and keeping scheduled follow up appointments with me and any other providers. Encouraged patient to have appointment for complete physical, fasting labs, appropriate screenings, and immunizations on an annual basis.       Follow up as needed for acute illness

## 2018-10-03 LAB
HBV SURFACE AB SER-ACNC: <3.1 MIU/ML
VZV IGG SER IA-ACNC: 1270 INDEX

## 2018-10-04 LAB
MEV IGG SER IA-ACNC: >300 AU/ML
MUV IGG SER IA-ACNC: >300 AU/ML
RUBV IGG SERPL IA-ACNC: 5.02 INDEX

## 2018-10-08 ENCOUNTER — TELEPHONE (OUTPATIENT)
Dept: FAMILY MEDICINE CLINIC | Facility: CLINIC | Age: 38
End: 2018-10-08

## 2018-10-08 NOTE — TELEPHONE ENCOUNTER
----- Message from Esther Spencer DO sent at 10/8/2018 10:02 AM EDT -----  Regarding: FW: PLEASE CALL   Contact: [phone]  Patient is immune to MMR and does not need this vaccine  ----- Message -----  From: Ruth Gonzalez MA  Sent: 10/8/2018   8:18 AM  To: Esther Spencer DO  Subject: FW: PLEASE CALL                                  Labs back   ----- Message -----  From: Esther Spencer DO  Sent: 10/3/2018   6:28 PM  To: Ruth Gonzalez MA  Subject: FW: PLEASE CALL                                  MMR still pending. Can you check to see if it is back from the lab? + immunity to Varicella but pt does need to start Hep B series.  ----- Message -----  From: Ruth Gonzalez MA  Sent: 10/3/2018   3:05 PM  To: Esther Spencer DO  Subject: FW: PLEASE CALL                                      ----- Message -----  From: Rita Mark  Sent: 10/3/2018   2:59 PM  To: Ruth Gonzalez MA  Subject: PLEASE CALL                                      PLEASE CALL WITH LAB RESULTS

## 2018-12-27 ENCOUNTER — OFFICE VISIT (OUTPATIENT)
Dept: FAMILY MEDICINE CLINIC | Facility: CLINIC | Age: 38
End: 2018-12-27

## 2018-12-27 VITALS
BODY MASS INDEX: 37.8 KG/M2 | HEART RATE: 72 BPM | DIASTOLIC BLOOD PRESSURE: 76 MMHG | OXYGEN SATURATION: 96 % | TEMPERATURE: 98.3 F | SYSTOLIC BLOOD PRESSURE: 122 MMHG | HEIGHT: 59 IN | WEIGHT: 187.5 LBS | RESPIRATION RATE: 18 BRPM

## 2018-12-27 DIAGNOSIS — H65.193 ACUTE SEROMUCINOUS OTITIS MEDIA, BILATERAL: ICD-10-CM

## 2018-12-27 DIAGNOSIS — J06.9 ACUTE URI: ICD-10-CM

## 2018-12-27 DIAGNOSIS — R52 BODY ACHES: ICD-10-CM

## 2018-12-27 DIAGNOSIS — J02.9 SORE THROAT: Primary | ICD-10-CM

## 2018-12-27 LAB
EXPIRATION DATE: NORMAL
EXPIRATION DATE: NORMAL
FLUAV AG NPH QL: NEGATIVE
FLUBV AG NPH QL: NEGATIVE
INTERNAL CONTROL: NORMAL
INTERNAL CONTROL: NORMAL
Lab: NORMAL
Lab: NORMAL
S PYO AG THROAT QL: NEGATIVE

## 2018-12-27 PROCEDURE — 99213 OFFICE O/P EST LOW 20 MIN: CPT | Performed by: NURSE PRACTITIONER

## 2018-12-27 PROCEDURE — 87804 INFLUENZA ASSAY W/OPTIC: CPT | Performed by: NURSE PRACTITIONER

## 2018-12-27 PROCEDURE — 87880 STREP A ASSAY W/OPTIC: CPT | Performed by: NURSE PRACTITIONER

## 2018-12-27 RX ORDER — PSEUDOEPHEDRINE HYDROCHLORIDE 60 MG/1
60 TABLET, FILM COATED ORAL EVERY 6 HOURS PRN
Qty: 28 TABLET | Refills: 0 | Status: SHIPPED | OUTPATIENT
Start: 2018-12-27 | End: 2019-01-03

## 2018-12-27 RX ORDER — DEXTROMETHORPHAN HYDROBROMIDE AND PROMETHAZINE HYDROCHLORIDE 15; 6.25 MG/5ML; MG/5ML
5 SYRUP ORAL 4 TIMES DAILY PRN
Qty: 118 ML | Refills: 0 | Status: SHIPPED | OUTPATIENT
Start: 2018-12-27 | End: 2019-01-26

## 2018-12-27 RX ORDER — AMOXICILLIN AND CLAVULANATE POTASSIUM 875; 125 MG/1; MG/1
1 TABLET, FILM COATED ORAL 2 TIMES DAILY
Qty: 20 TABLET | Refills: 0 | Status: SHIPPED | OUTPATIENT
Start: 2018-12-27 | End: 2019-01-06

## 2018-12-27 NOTE — PROGRESS NOTES
Eun Majano is a 38 y.o. female who presents for URI symptoms.    Chief Complaint   Patient presents with   • URI       Patient states she starting feeling ill Saturday night. She has a sore throat, ear pain, cough, and sinus congestion/drainage. States she has not felt well for the past 2 months. Has taken some left over Amoxicillin 500mg, Zyrtec, and nasal spray without much relief.          Past Medical History:   Diagnosis Date   • Abnormal Pap smear of cervix     HPV   • Fibroid     RESOLVED LAST PREGNANCY   • Recurrent pregnancy loss, antepartum condition or complication     X 3       Past Surgical History:   Procedure Laterality Date   • HEMORRHOIDECTOMY     • TONSILLECTOMY     • WISDOM TOOTH EXTRACTION         Family History   Problem Relation Age of Onset   • No Known Problems Mother    • Cancer Father         bladder       Social History     Socioeconomic History   • Marital status:      Spouse name: Not on file   • Number of children: Not on file   • Years of education: Not on file   • Highest education level: Not on file   Social Needs   • Financial resource strain: Not on file   • Food insecurity - worry: Not on file   • Food insecurity - inability: Not on file   • Transportation needs - medical: Not on file   • Transportation needs - non-medical: Not on file   Occupational History   • Not on file   Tobacco Use   • Smoking status: Never Smoker   • Smokeless tobacco: Never Used   Substance and Sexual Activity   • Alcohol use: No   • Drug use: No   • Sexual activity: Defer   Other Topics Concern   • Not on file   Social History Narrative   • Not on file       No Known Allergies    ROS    Review of Systems   Constitutional: Positive for chills and fatigue.   HENT: Positive for congestion, postnasal drip, rhinorrhea, sinus pressure and sore throat.    Respiratory: Positive for cough.    Gastrointestinal: Positive for nausea.   Neurological: Positive for headache.   All other systems reviewed and  "are negative.      Vitals:    12/27/18 1835   BP: 122/76   BP Location: Right arm   Patient Position: Sitting   Pulse: 72   Resp: 18   Temp: 98.3 °F (36.8 °C)   TempSrc: Temporal   SpO2: 96%   Weight: 85 kg (187 lb 8 oz)   Height: 149.9 cm (59\")         Current Outpatient Medications:   •  Cetirizine HCl (ZYRTEC ALLERGY) 10 MG capsule, Take  by mouth., Disp: , Rfl:   •  amoxicillin-clavulanate (AUGMENTIN) 875-125 MG per tablet, Take 1 tablet by mouth 2 (Two) Times a Day for 10 days., Disp: 20 tablet, Rfl: 0  •  promethazine-dextromethorphan (PROMETHAZINE-DM) 6.25-15 MG/5ML syrup, Take 5 mL by mouth 4 (Four) Times a Day As Needed for Cough., Disp: 118 mL, Rfl: 0  •  pseudoephedrine (SUDAFED) 60 MG tablet, Take 1 tablet by mouth Every 6 (Six) Hours As Needed for Congestion for up to 7 days., Disp: 28 tablet, Rfl: 0    PE    Physical Exam   Constitutional: She is oriented to person, place, and time. She appears well-developed and well-nourished.   HENT:   Head: Normocephalic and atraumatic.   Right Ear: Tympanic membrane is erythematous and bulging. A middle ear effusion is present.   Left Ear: Tympanic membrane is erythematous and bulging. A middle ear effusion is present.   Nose: Mucosal edema, rhinorrhea and congestion present. Right sinus exhibits maxillary sinus tenderness. Left sinus exhibits maxillary sinus tenderness.   Mouth/Throat: Uvula is midline. Posterior oropharyngeal edema and posterior oropharyngeal erythema present. Tonsils are 2+ on the right. Tonsils are 2+ on the left.   Neck: Normal range of motion. Neck supple.   Cardiovascular: Normal rate, regular rhythm and normal heart sounds. Exam reveals no gallop and no friction rub.   No murmur heard.  Pulmonary/Chest: Effort normal and breath sounds normal. No stridor. No respiratory distress. She has no wheezes. She has no rales.   Lymphadenopathy:     She has cervical adenopathy.   Neurological: She is alert and oriented to person, place, and time. "   Skin: Skin is warm and dry. Capillary refill takes less than 2 seconds.   Psychiatric: She has a normal mood and affect. Her behavior is normal. Judgment and thought content normal.   Nursing note and vitals reviewed.       A/P    Eun was seen today for uri.    Diagnoses and all orders for this visit:    Sore throat  -     POC Rapid Strep A  -     POC Influenza A / B    Acute seromucinous otitis media, bilateral  -     amoxicillin-clavulanate (AUGMENTIN) 875-125 MG per tablet; Take 1 tablet by mouth 2 (Two) Times a Day for 10 days.    Acute URI  -     pseudoephedrine (SUDAFED) 60 MG tablet; Take 1 tablet by mouth Every 6 (Six) Hours As Needed for Congestion for up to 7 days.  -     promethazine-dextromethorphan (PROMETHAZINE-DM) 6.25-15 MG/5ML syrup; Take 5 mL by mouth 4 (Four) Times a Day As Needed for Cough.    Body aches  -     POC Rapid Strep A  -     POC Influenza A / B        Plan of care reviewed with patient at the conclusion of today's visit. Education was provided regarding diagnosis, management and any prescribed or recommended OTC medications.  Patient verbalizes understanding of and agreement with management plan.    Return if symptoms worsen or fail to improve.     HUGO Garsia

## 2019-01-26 ENCOUNTER — OFFICE VISIT (OUTPATIENT)
Dept: FAMILY MEDICINE CLINIC | Facility: CLINIC | Age: 39
End: 2019-01-26

## 2019-01-26 VITALS
SYSTOLIC BLOOD PRESSURE: 100 MMHG | TEMPERATURE: 97.4 F | DIASTOLIC BLOOD PRESSURE: 60 MMHG | HEART RATE: 68 BPM | RESPIRATION RATE: 18 BRPM | HEIGHT: 59 IN | OXYGEN SATURATION: 99 % | WEIGHT: 190 LBS | BODY MASS INDEX: 38.3 KG/M2

## 2019-01-26 DIAGNOSIS — R31.9 HEMATURIA, UNSPECIFIED TYPE: ICD-10-CM

## 2019-01-26 DIAGNOSIS — R30.0 DYSURIA: Primary | ICD-10-CM

## 2019-01-26 LAB
BILIRUB BLD-MCNC: NEGATIVE MG/DL
CLARITY, POC: ABNORMAL
COLOR UR: YELLOW
GLUCOSE UR STRIP-MCNC: NEGATIVE MG/DL
KETONES UR QL: NEGATIVE
LEUKOCYTE EST, POC: NEGATIVE
NITRITE UR-MCNC: NEGATIVE MG/ML
PH UR: 6 [PH] (ref 5–8)
PROT UR STRIP-MCNC: NEGATIVE MG/DL
RBC # UR STRIP: ABNORMAL /UL
SP GR UR: 1.02 (ref 1–1.03)
UROBILINOGEN UR QL: NORMAL

## 2019-01-26 PROCEDURE — 87086 URINE CULTURE/COLONY COUNT: CPT | Performed by: NURSE PRACTITIONER

## 2019-01-26 PROCEDURE — 81003 URINALYSIS AUTO W/O SCOPE: CPT | Performed by: NURSE PRACTITIONER

## 2019-01-26 PROCEDURE — 99213 OFFICE O/P EST LOW 20 MIN: CPT | Performed by: NURSE PRACTITIONER

## 2019-01-26 RX ORDER — NITROFURANTOIN 25; 75 MG/1; MG/1
100 CAPSULE ORAL 2 TIMES DAILY
Qty: 14 CAPSULE | Refills: 0 | Status: SHIPPED | OUTPATIENT
Start: 2019-01-26 | End: 2019-02-02

## 2019-01-26 NOTE — PATIENT INSTRUCTIONS
Dysuria  Dysuria is pain or discomfort while urinating. The pain or discomfort may be felt in the tube that carries urine out of the bladder (urethra) or in the surrounding tissue of the genitals. The pain may also be felt in the groin area, lower abdomen, and lower back. You may have to urinate frequently or have the sudden feeling that you have to urinate (urgency). Dysuria can affect both men and women, but is more common in women.  Dysuria can be caused by many different things, including:  · Urinary tract infection in women.  · Infection of the kidney or bladder.  · Kidney stones or bladder stones.  · Certain sexually transmitted infections (STIs), such as chlamydia.  · Dehydration.  · Inflammation of the vagina.  · Use of certain medicines.  · Use of certain soaps or scented products that cause irritation.    Follow these instructions at home:  Watch your dysuria for any changes. The following actions may help to reduce any discomfort you are feeling:  · Drink enough fluid to keep your urine clear or pale yellow.  · Empty your bladder often. Avoid holding urine for long periods of time.  · After a bowel movement or urination, women should cleanse from front to back, using each tissue only once.  · Empty your bladder after sexual intercourse.  · Take medicines only as directed by your health care provider.  · If you were prescribed an antibiotic medicine, finish it all even if you start to feel better.  · Avoid caffeine, tea, and alcohol. They can irritate the bladder and make dysuria worse. In men, alcohol may irritate the prostate.  · Keep all follow-up visits as directed by your health care provider. This is important.  · If you had any tests done to find the cause of dysuria, it is your responsibility to obtain your test results. Ask the lab or department performing the test when and how you will get your results. Talk with your health care provider if you have any questions about your results.    Contact a  health care provider if:  · You develop pain in your back or sides.  · You have a fever.  · You have nausea or vomiting.  · You have blood in your urine.  · You are not urinating as often as you usually do.  Get help right away if:  · You pain is severe and not relieved with medicines.  · You are unable to hold down any fluids.  · You or someone else notices a change in your mental function.  · You have a rapid heartbeat at rest.  · You have shaking or chills.  · You feel extremely weak.  This information is not intended to replace advice given to you by your health care provider. Make sure you discuss any questions you have with your health care provider.  Document Released: 09/15/2005 Document Revised: 05/25/2017 Document Reviewed: 08/13/2015  [employer] Interactive Patient Education © 2018 [employer]    Hematuria, Adult  Hematuria is blood in your urine. It can be caused by a bladder infection, kidney infection, prostate infection, kidney stone, or cancer of your urinary tract. Infections can usually be treated with medicine, and a kidney stone usually will pass through your urine. If neither of these is the cause of your hematuria, further workup to find out the reason may be needed.  It is very important that you tell your health care provider about any blood you see in your urine, even if the blood stops without treatment or happens without causing pain. Blood in your urine that happens and then stops and then happens again can be a symptom of a very serious condition. Also, pain is not a symptom in the initial stages of many urinary cancers.  Follow these instructions at home:  · Drink lots of fluid, 3-4 quarts a day. If you have been diagnosed with an infection, cranberry juice is especially recommended, in addition to large amounts of water.  · Avoid caffeine, tea, and carbonated beverages because they tend to irritate the bladder.  · Avoid alcohol because it may irritate the prostate.  · Take all  medicines as directed by your health care provider.  · If you were prescribed an antibiotic medicine, finish it all even if you start to feel better.  · If you have been diagnosed with a kidney stone, follow your health care provider's instructions regarding straining your urine to catch the stone.  · Empty your bladder often. Avoid holding urine for long periods of time.  · After a bowel movement, women should cleanse front to back. Use each tissue only once.  · Empty your bladder before and after sexual intercourse if you are a female.  Contact a health care provider if:  · You develop back pain.  · You have a fever.  · You have a feeling of sickness in your stomach (nausea) or vomiting.  · Your symptoms are not better in 3 days. Return sooner if you are getting worse.  Get help right away if:  · You develop severe vomiting and are unable to keep the medicine down.  · You develop severe back or abdominal pain despite taking your medicines.  · You begin passing a large amount of blood or clots in your urine.  · You feel extremely weak or faint, or you pass out.  This information is not intended to replace advice given to you by your health care provider. Make sure you discuss any questions you have with your health care provider.  Document Released: 12/18/2006 Document Revised: 05/25/2017 Document Reviewed: 08/18/2014  [employer] Interactive Patient Education © 2017 [employer]  Nitrofurantoin tablets or capsules  What is this medicine?  NITROFURANTOIN (grant SHIELDS toyn) is an antibiotic. It is used to treat urinary tract infections.  This medicine may be used for other purposes; ask your health care provider or pharmacist if you have questions.  COMMON BRAND NAME(S): Macrobid, Macrodantin, Urotoin  What should I tell my health care provider before I take this medicine?  They need to know if you have any of these conditions:  -anemia  -diabetes  -glucose-6-phosphate dehydrogenase deficiency  -kidney  disease  -liver disease  -lung disease  -other chronic illness  -an unusual or allergic reaction to nitrofurantoin, other antibiotics, other medicines, foods, dyes or preservatives  -pregnant or trying to get pregnant  -breast-feeding  How should I use this medicine?  Take this medicine by mouth with a glass of water. Follow the directions on the prescription label. Take this medicine with food or milk. Take your doses at regular intervals. Do not take your medicine more often than directed. Do not stop taking except on your doctor's advice.  Talk to your pediatrician regarding the use of this medicine in children. While this drug may be prescribed for selected conditions, precautions do apply.  Overdosage: If you think you have taken too much of this medicine contact a poison control center or emergency room at once.  NOTE: This medicine is only for you. Do not share this medicine with others.  What if I miss a dose?  If you miss a dose, take it as soon as you can. If it is almost time for your next dose, take only that dose. Do not take double or extra doses.  What may interact with this medicine?  -antacids containing magnesium trisilicate  -probenecid  -quinolone antibiotics like ciprofloxacin, lomefloxacin, norfloxacin and ofloxacin  -sulfinpyrazone  This list may not describe all possible interactions. Give your health care provider a list of all the medicines, herbs, non-prescription drugs, or dietary supplements you use. Also tell them if you smoke, drink alcohol, or use illegal drugs. Some items may interact with your medicine.  What should I watch for while using this medicine?  Tell your doctor or health care professional if your symptoms do not improve or if you get new symptoms. Drink several glasses of water a day. If you are taking this medicine for a long time, visit your doctor for regular checks on your progress.  If you are diabetic, you may get a false positive result for sugar in your urine with  certain brands of urine tests. Check with your doctor.  What side effects may I notice from receiving this medicine?  Side effects that you should report to your doctor or health care professional as soon as possible:  -allergic reactions like skin rash or hives, swelling of the face, lips, or tongue  -chest pain  -cough  -difficulty breathing  -dizziness, drowsiness  -fever or infection  -joint aches or pains  -pale or blue-tinted skin  -redness, blistering, peeling or loosening of the skin, including inside the mouth  -tingling, burning, pain, or numbness in hands or feet  -unusual bleeding or bruising  -unusually weak or tired  -yellowing of eyes or skin  Side effects that usually do not require medical attention (report to your doctor or health care professional if they continue or are bothersome):  -dark urine  -diarrhea  -headache  -loss of appetite  -nausea or vomiting  -temporary hair loss  This list may not describe all possible side effects. Call your doctor for medical advice about side effects. You may report side effects to FDA at 6-650-FDA-8554.  Where should I keep my medicine?  Keep out of the reach of children.  Store at room temperature between 15 and 30 degrees C (59 and 86 degrees F). Protect from light. Throw away any unused medicine after the expiration date.  NOTE: This sheet is a summary. It may not cover all possible information. If you have questions about this medicine, talk to your doctor, pharmacist, or health care provider.  © 2018 [employer] (2009-07-08 15:56:47)

## 2019-01-27 NOTE — PROGRESS NOTES
Subjective   Eun Majano is a 38 y.o. female.     Urinary Tract Infection    This is a new problem. Episode onset: 3 days. The problem occurs intermittently. The problem has been waxing and waning. The quality of the pain is described as burning. The pain is mild. She is sexually active. Associated symptoms include frequency and urgency. Pertinent negatives include no chills, discharge, flank pain, hematuria, nausea, possible pregnancy, sweats or vomiting. She has tried nothing for the symptoms.       The following portions of the patient's history were reviewed and updated as appropriate: allergies, current medications, past family history, past medical history, past social history, past surgical history and problem list.     No Known Allergies   Patient's last menstrual period was 01/08/2019.     Review of Systems   Constitutional: Negative for appetite change, chills, diaphoresis, fatigue and fever.   Respiratory: Negative.    Cardiovascular: Negative.    Gastrointestinal: Negative for abdominal pain, nausea and vomiting.   Genitourinary: Positive for dysuria, frequency and urgency. Negative for flank pain and hematuria.        Denies personal or family history of kidney stones   Musculoskeletal: Negative for back pain.   Skin: Negative for rash.       Objective   Physical Exam   Constitutional: She is oriented to person, place, and time. Vital signs are normal. She appears well-developed and well-nourished. She is cooperative. She does not appear ill. No distress.   HENT:   Mouth/Throat: Uvula is midline and mucous membranes are normal.   Cardiovascular: Normal rate, regular rhythm and normal heart sounds.   Pulmonary/Chest: Effort normal and breath sounds normal.   Abdominal: Soft. Normal appearance. She exhibits no distension. There is no tenderness. There is no CVA tenderness.   Neurological: She is alert and oriented to person, place, and time.   Skin: Skin is warm, dry and intact. No rash noted. She is  not diaphoretic.   Psychiatric: She has a normal mood and affect. Her behavior is normal. Judgment and thought content normal.   Vitals reviewed.    Vitals:    01/26/19 1425   BP: 100/60   Pulse: 68   Resp: 18   Temp: 97.4 °F (36.3 °C)   SpO2: 99%   Body mass index is 38.38 kg/m².     Assessment/Plan   Eun was seen today for urinary tract infection.    Diagnoses and all orders for this visit:    Dysuria  -     Urine Culture - Urine, Urine, Clean Catch  -     nitrofurantoin, macrocrystal-monohydrate, (MACROBID) 100 MG capsule; Take 1 capsule by mouth 2 (Two) Times a Day for 7 days.  -     POC Urinalysis Dipstick, Automated    Hematuria, unspecified type  -     Urine Culture - Urine, Urine, Clean Catch       Brief Urine Lab Results  (Last result in the past 365 days)      Color   Clarity   Blood   Leuk Est   Nitrite   Protein   CREAT   Urine HCG        01/26/19 1504 Yellow Cloudy Trace Negative Negative Negative             Discussed the nature of the medical condition(s) risks, complications, implications, management, safe and proper use of medications. Encouraged medication compliance, and keeping scheduled follow up appointments with me and any other providers.     RTC if symptoms fail to improve, to ER if symptoms worsen.

## 2019-01-28 LAB — BACTERIA SPEC AEROBE CULT: NORMAL

## 2019-06-12 ENCOUNTER — OFFICE VISIT (OUTPATIENT)
Dept: FAMILY MEDICINE CLINIC | Facility: CLINIC | Age: 39
End: 2019-06-12

## 2019-06-12 VITALS
WEIGHT: 194.6 LBS | TEMPERATURE: 98 F | BODY MASS INDEX: 39.23 KG/M2 | DIASTOLIC BLOOD PRESSURE: 80 MMHG | HEIGHT: 59 IN | SYSTOLIC BLOOD PRESSURE: 112 MMHG | RESPIRATION RATE: 16 BRPM | HEART RATE: 65 BPM | OXYGEN SATURATION: 98 %

## 2019-06-12 DIAGNOSIS — H66.001 RIGHT ACUTE SUPPURATIVE OTITIS MEDIA: ICD-10-CM

## 2019-06-12 DIAGNOSIS — J02.9 SORE THROAT: Primary | ICD-10-CM

## 2019-06-12 LAB
EXPIRATION DATE: NORMAL
INTERNAL CONTROL: NORMAL
Lab: NORMAL
S PYO AG THROAT QL: NEGATIVE

## 2019-06-12 PROCEDURE — 87880 STREP A ASSAY W/OPTIC: CPT | Performed by: NURSE PRACTITIONER

## 2019-06-12 PROCEDURE — 99213 OFFICE O/P EST LOW 20 MIN: CPT | Performed by: NURSE PRACTITIONER

## 2019-06-12 RX ORDER — AMOXICILLIN 875 MG/1
875 TABLET, COATED ORAL 2 TIMES DAILY
Qty: 20 TABLET | Refills: 0 | Status: SHIPPED | OUTPATIENT
Start: 2019-06-12 | End: 2019-06-22

## 2019-06-12 RX ORDER — PSEUDOEPHEDRINE HCL 30 MG
30 TABLET ORAL EVERY 6 HOURS PRN
Qty: 40 TABLET | Refills: 0 | OUTPATIENT
Start: 2019-06-12 | End: 2022-11-26

## 2019-06-12 NOTE — PROGRESS NOTES
"Edwige Majano is a 39 y.o. female.   Chief Complaint   Patient presents with   • Sore Throat   • Earache     Rt ear    walk in     Sore Throat    The pain is at a severity of 6/10. The pain is moderate. Associated symptoms include congestion, coughing, ear pain and headaches. Pertinent negatives include no diarrhea or drooling. She has had no exposure to strep or mono. She has tried NSAIDs (zyrtec, aleve ) for the symptoms. The treatment provided mild relief.   Earache    Associated symptoms include coughing, headaches and a sore throat. Pertinent negatives include no diarrhea.        The following portions of the patient's history were reviewed and updated as appropriate: allergies, current medications, past family history, past medical history, past social history, past surgical history and problem list.    Review of Systems   HENT: Positive for congestion, ear pain and sore throat. Negative for drooling.    Respiratory: Positive for cough.    Gastrointestinal: Negative for diarrhea.   Neurological: Positive for headaches.     Past Surgical History:   Procedure Laterality Date   • HEMORRHOIDECTOMY     • TONSILLECTOMY     • WISDOM TOOTH EXTRACTION       Past Medical History:   Diagnosis Date   • Abnormal Pap smear of cervix     HPV   • Fibroid     RESOLVED LAST PREGNANCY   • Recurrent pregnancy loss, antepartum condition or complication     X 3       Objective   No Known Allergies  Visit Vitals  /80   Pulse 65   Temp 98 °F (36.7 °C)   Resp 16   Ht 149.9 cm (59\")   Wt 88.3 kg (194 lb 9.6 oz)   SpO2 98%   BMI 39.30 kg/m²       Physical Exam   Constitutional: Vital signs are normal. She appears well-developed and well-nourished. She is cooperative. She appears ill.   HENT:   Head: Normocephalic.   Right Ear: Hearing, external ear and ear canal normal. Tympanic membrane is erythematous and bulging.   Left Ear: Hearing, tympanic membrane, external ear and ear canal normal.   Eyes: Pupils are equal, " round, and reactive to light.   Neck: Normal range of motion.   Cardiovascular: Normal rate and regular rhythm.   Pulmonary/Chest: Effort normal and breath sounds normal.   Lymphadenopathy:     She has cervical adenopathy.   Neurological: She is alert.   Skin: Skin is warm and dry. Capillary refill takes less than 2 seconds.   Psychiatric: She has a normal mood and affect. Her behavior is normal.   Vitals reviewed.      Assessment/Plan   Eun was seen today for sore throat and earache.    Diagnoses and all orders for this visit:    Sore throat  -     POCT rapid strep A  -     pseudoephedrine (SUDAFED) 30 MG tablet; Take 1 tablet by mouth Every 6 (Six) Hours As Needed for Congestion.    Right acute suppurative otitis media  -     amoxicillin (AMOXIL) 875 MG tablet; Take 1 tablet by mouth 2 (Two) Times a Day for 10 days.    will treat with amoxicillin for the otitis media and sudafed for the post nasal drainage.   Increase water intake.   Take ibuprofen or tylenol for pain.   Gargle warm salt water 1/4 teaspoon salt in 4 oz.warm water twice daily as needed.   Change toothbrush in 1 week   Avoid dairy products - this can increase your congestion.     Follow up with your PCP as needed.       POCT Rapid strep:Negative   See otitis media patient instructions   Follow up if you fail to improve.         Patient Instructions   Otitis Media, Adult  Otitis media occurs when there is inflammation and fluid in the middle ear. Your middle ear is a part of the ear that contains bones for hearing as well as air that helps send sounds to your brain.  What are the causes?  This condition is caused by a blockage in the eustachian tube. This tube drains fluid from the ear to the back of the nose (nasopharynx). A blockage in this tube can be caused by an object or by swelling (edema) in the tube. Problems that can cause a blockage include:  · A cold or other upper respiratory infection.  · Allergies.  · An irritant, such as tobacco  smoke.  · Enlarged adenoids. The adenoids are areas of soft tissue located high in the back of the throat, behind the nose and the roof of the mouth.  · A mass in the nasopharynx.  · Damage to the ear caused by pressure changes (barotrauma).    What are the signs or symptoms?  Symptoms of this condition include:  · Ear pain.  · A fever.  · Decreased hearing.  · A headache.  · Tiredness (lethargy).  · Fluid leaking from the ear.  · Ringing in the ear.    How is this diagnosed?  This condition is diagnosed with a physical exam. During the exam your health care provider will use an instrument called an otoscope to look into your ear and check for redness, swelling, and fluid. He or she will also ask about your symptoms.  Your health care provider may also order tests, such as:  · A test to check the movement of the eardrum (pneumatic otoscopy). This test is done by squeezing a small amount of air into the ear.  · A test that changes air pressure in the middle ear to check how well the eardrum moves and whether the eustachian tube is working (tympanogram).    How is this treated?  This condition usually goes away on its own within 3-5 days. But if the condition is caused by a bacteria infection and does not go away own its own, or keeps coming back, your health care provider may:  · Prescribe antibiotic medicines to treat the infection.  · Prescribe or recommend medicines to control pain.    Follow these instructions at home:  · Take over-the-counter and prescription medicines only as told by your health care provider.  · If you were prescribed an antibiotic medicine, take it as told by your health care provider. Do not stop taking the antibiotic even if you start to feel better.  · Keep all follow-up visits as told by your health care provider. This is important.  Contact a health care provider if:  · You have bleeding from your nose.  · There is a lump on your neck.  · You are not getting better in 5 days.  · You feel  worse instead of better.  Get help right away if:  · You have severe pain that is not controlled with medicine.  · You have swelling, redness, or pain around your ear.  · You have stiffness in your neck.  · A part of your face is paralyzed.  · The bone behind your ear (mastoid) is tender when you touch it.  · You develop a severe headache.  Summary  · Otitis media is redness, soreness, and swelling of the middle ear.  · This condition usually goes away on its own within 3-5 days.  · If the problem does not go away in 3-5 days, your health care provider may prescribe or recommend medicines to treat your symptoms.  · If you were prescribed an antibiotic medicine, take it as told by your health care provider.  This information is not intended to replace advice given to you by your health care provider. Make sure you discuss any questions you have with your health care provider.  Document Released: 09/22/2005 Document Revised: 12/08/2017 Document Reviewed: 12/08/2017  [employer] Interactive Patient Education © 2019 [employer]        Anahi Evans, APRN

## 2019-06-12 NOTE — PATIENT INSTRUCTIONS

## 2020-03-14 ENCOUNTER — OFFICE VISIT (OUTPATIENT)
Dept: FAMILY MEDICINE CLINIC | Facility: CLINIC | Age: 40
End: 2020-03-14

## 2020-03-14 VITALS
HEART RATE: 65 BPM | DIASTOLIC BLOOD PRESSURE: 80 MMHG | WEIGHT: 200 LBS | HEIGHT: 59 IN | SYSTOLIC BLOOD PRESSURE: 110 MMHG | OXYGEN SATURATION: 96 % | RESPIRATION RATE: 19 BRPM | TEMPERATURE: 98.5 F | BODY MASS INDEX: 40.32 KG/M2

## 2020-03-14 DIAGNOSIS — R68.89 FLU-LIKE SYMPTOMS: Primary | ICD-10-CM

## 2020-03-14 LAB
EXPIRATION DATE: NORMAL
FLUAV AG NPH QL: NEGATIVE
FLUBV AG NPH QL: NEGATIVE
INTERNAL CONTROL: NORMAL
Lab: NORMAL

## 2020-03-14 PROCEDURE — 99213 OFFICE O/P EST LOW 20 MIN: CPT | Performed by: FAMILY MEDICINE

## 2020-03-14 PROCEDURE — 87804 INFLUENZA ASSAY W/OPTIC: CPT | Performed by: FAMILY MEDICINE

## 2020-03-14 RX ORDER — OSELTAMIVIR PHOSPHATE 75 MG/1
75 CAPSULE ORAL 2 TIMES DAILY
Qty: 10 CAPSULE | Refills: 0 | Status: SHIPPED | OUTPATIENT
Start: 2020-03-14 | End: 2021-05-29

## 2020-03-14 NOTE — PROGRESS NOTES
"Subjective   Eun Majano is a 40 y.o. female.     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  He is having some body aches congestion cough no high fever.  No known exposure to coronavirus, or recent travel to high risk areas.    Influenza   This is a new problem. The current episode started yesterday. The problem has been unchanged. Associated symptoms include congestion, coughing, a fever, headaches, myalgias and a sore throat. Pertinent negatives include no arthralgias, chest pain, chills, joint swelling, nausea, vomiting or weakness. She has tried acetaminophen, drinking and NSAIDs for the symptoms. The treatment provided mild relief.        The following portions of the patient's history were reviewed and updated as appropriate: allergies, current medications, past social history and problem list.    Review of Systems   Constitutional: Positive for fever. Negative for chills.   HENT: Positive for congestion and sore throat. Negative for postnasal drip.    Eyes: Negative for pain and visual disturbance.   Respiratory: Positive for cough. Negative for shortness of breath.    Cardiovascular: Negative for chest pain and palpitations.   Gastrointestinal: Negative for nausea and vomiting.   Genitourinary: Negative for dysuria and hematuria.   Musculoskeletal: Positive for myalgias. Negative for arthralgias and joint swelling.   Skin: Negative.    Neurological: Positive for headaches. Negative for weakness.   Hematological: Negative for adenopathy.       Objective   /80   Pulse 65   Temp 98.5 °F (36.9 °C)   Resp 19   Ht 149.9 cm (59\")   Wt 90.7 kg (200 lb)   SpO2 96%   BMI 40.40 kg/m²   Physical Exam   Constitutional: She is oriented to person, place, and time. She appears well-developed and well-nourished. She is cooperative.   HENT:   Head: Normocephalic.   Right Ear: External ear normal.   Left Ear: External ear normal.   Nose: Nose normal. "   Mouth/Throat: Oropharynx is clear and moist.   Clear postnasal drip   Eyes: Pupils are equal, round, and reactive to light. Conjunctivae are normal. No scleral icterus.   Neck: Neck supple. Carotid bruit is not present. No thyromegaly present.   Cardiovascular: Normal rate and regular rhythm.   Pulmonary/Chest: Effort normal and breath sounds normal. She has no wheezes. She has no rales.   A few scattered upper airway rhonchi no rales   Abdominal: There is no hepatosplenomegaly.   Musculoskeletal: Normal range of motion.   Lymphadenopathy:     She has no cervical adenopathy.   Neurological: She is alert and oriented to person, place, and time.   No focal deficits no lateralizing signs   Skin: Skin is warm and dry. No rash noted.   Psychiatric: She has a normal mood and affect. Cognition and memory are normal.   Nursing note and vitals reviewed.      Assessment/Plan   Problem List Items Addressed This Visit     None      Visit Diagnoses     Flu-like symptoms    -  Primary    Relevant Orders    POCT Influenza A/B (Completed)          New Medications Ordered This Visit   Medications   • oseltamivir (TAMIFLU) 75 MG capsule     Sig: Take 1 capsule by mouth 2 (Two) Times a Day.     Dispense:  10 capsule     Refill:  0     Rapid flu test was negative but due to high risk exposure recommend she take 5 days of Tamiflu and stay home from work.  Treat symptoms as directed.  If symptoms worsen persist or change in sputum color call or return to clinic.

## 2020-06-15 ENCOUNTER — TRANSCRIBE ORDERS (OUTPATIENT)
Dept: ADMINISTRATIVE | Facility: HOSPITAL | Age: 40
End: 2020-06-15

## 2020-06-15 DIAGNOSIS — Z12.31 SCREENING MAMMOGRAM, ENCOUNTER FOR: Primary | ICD-10-CM

## 2020-06-27 ENCOUNTER — HOSPITAL ENCOUNTER (OUTPATIENT)
Dept: MAMMOGRAPHY | Facility: HOSPITAL | Age: 40
Discharge: HOME | End: 2020-06-27
Admitting: OBSTETRICS & GYNECOLOGY

## 2020-06-27 DIAGNOSIS — Z12.31 SCREENING MAMMOGRAM, ENCOUNTER FOR: ICD-10-CM

## 2020-06-27 PROCEDURE — 77063 BREAST TOMOSYNTHESIS BI: CPT

## 2020-06-27 PROCEDURE — 77067 SCR MAMMO BI INCL CAD: CPT

## 2020-06-27 PROCEDURE — 77063 BREAST TOMOSYNTHESIS BI: CPT | Performed by: RADIOLOGY

## 2020-06-27 PROCEDURE — 77067 SCR MAMMO BI INCL CAD: CPT | Performed by: RADIOLOGY

## 2020-07-31 ENCOUNTER — TELEPHONE (OUTPATIENT)
Dept: FAMILY MEDICINE CLINIC | Facility: CLINIC | Age: 40
End: 2020-07-31

## 2020-07-31 PROCEDURE — U0003 INFECTIOUS AGENT DETECTION BY NUCLEIC ACID (DNA OR RNA); SEVERE ACUTE RESPIRATORY SYNDROME CORONAVIRUS 2 (SARS-COV-2) (CORONAVIRUS DISEASE [COVID-19]), AMPLIFIED PROBE TECHNIQUE, MAKING USE OF HIGH THROUGHPUT TECHNOLOGIES AS DESCRIBED BY CMS-2020-01-R: HCPCS | Performed by: NURSE PRACTITIONER

## 2020-07-31 NOTE — TELEPHONE ENCOUNTER
Pt said she is having right ear pain and is wanting to schedule an appt.  Pt said she has been around someone in the last 14 days that tested positive for Covid-19.  Pt said she was around Pt on Saturday that received their positive results yesterday.  Pt answered no to the rest of the travel questions.  Pt declined a video visit.  Pt is requesting a call back to let her know if she can be seen in the office.

## 2021-05-29 ENCOUNTER — OFFICE VISIT (OUTPATIENT)
Dept: FAMILY MEDICINE CLINIC | Facility: CLINIC | Age: 41
End: 2021-05-29

## 2021-05-29 VITALS
RESPIRATION RATE: 16 BRPM | TEMPERATURE: 98.4 F | SYSTOLIC BLOOD PRESSURE: 102 MMHG | BODY MASS INDEX: 39.03 KG/M2 | DIASTOLIC BLOOD PRESSURE: 64 MMHG | WEIGHT: 198.8 LBS | OXYGEN SATURATION: 98 % | HEART RATE: 60 BPM | HEIGHT: 60 IN

## 2021-05-29 DIAGNOSIS — J02.9 SORE THROAT: ICD-10-CM

## 2021-05-29 DIAGNOSIS — J01.40 ACUTE NON-RECURRENT PANSINUSITIS: Primary | ICD-10-CM

## 2021-05-29 LAB
EXPIRATION DATE: NORMAL
INTERNAL CONTROL: NORMAL
Lab: NORMAL
S PYO AG THROAT QL: NEGATIVE

## 2021-05-29 PROCEDURE — 99213 OFFICE O/P EST LOW 20 MIN: CPT | Performed by: NURSE PRACTITIONER

## 2021-05-29 PROCEDURE — 87880 STREP A ASSAY W/OPTIC: CPT | Performed by: NURSE PRACTITIONER

## 2021-05-29 RX ORDER — CEFDINIR 300 MG/1
300 CAPSULE ORAL 2 TIMES DAILY
Qty: 20 CAPSULE | Refills: 0 | Status: SHIPPED | OUTPATIENT
Start: 2021-05-29 | End: 2021-06-08

## 2021-05-29 NOTE — PATIENT INSTRUCTIONS
Sinusitis, Adult  Sinusitis is inflammation of your sinuses. Sinuses are hollow spaces in the bones around your face. Your sinuses are located:  · Around your eyes.  · In the middle of your forehead.  · Behind your nose.  · In your cheekbones.  Mucus normally drains out of your sinuses. When your nasal tissues become inflamed or swollen, mucus can become trapped or blocked. This allows bacteria, viruses, and fungi to grow, which leads to infection. Most infections of the sinuses are caused by a virus.  Sinusitis can develop quickly. It can last for up to 4 weeks (acute) or for more than 12 weeks (chronic). Sinusitis often develops after a cold.  What are the causes?  This condition is caused by anything that creates swelling in the sinuses or stops mucus from draining. This includes:  · Allergies.  · Asthma.  · Infection from bacteria or viruses.  · Deformities or blockages in your nose or sinuses.  · Abnormal growths in the nose (nasal polyps).  · Pollutants, such as chemicals or irritants in the air.  · Infection from fungi (rare).  What increases the risk?  You are more likely to develop this condition if you:  · Have a weak body defense system (immune system).  · Do a lot of swimming or diving.  · Overuse nasal sprays.  · Smoke.  What are the signs or symptoms?  The main symptoms of this condition are pain and a feeling of pressure around the affected sinuses. Other symptoms include:  · Stuffy nose or congestion.  · Thick drainage from your nose.  · Swelling and warmth over the affected sinuses.  · Headache.  · Upper toothache.  · A cough that may get worse at night.  · Extra mucus that collects in the throat or the back of the nose (postnasal drip).  · Decreased sense of smell and taste.  · Fatigue.  · A fever.  · Sore throat.  · Bad breath.  How is this diagnosed?  This condition is diagnosed based on:  · Your symptoms.  · Your medical history.  · A physical exam.  · Tests to find out if your condition is  acute or chronic. This may include:  ? Checking your nose for nasal polyps.  ? Viewing your sinuses using a device that has a light (endoscope).  ? Testing for allergies or bacteria.  ? Imaging tests, such as an MRI or CT scan.  In rare cases, a bone biopsy may be done to rule out more serious types of fungal sinus disease.  How is this treated?  Treatment for sinusitis depends on the cause and whether your condition is chronic or acute.  · If caused by a virus, your symptoms should go away on their own within 10 days. You may be given medicines to relieve symptoms. They include:  ? Medicines that shrink swollen nasal passages (topical intranasal decongestants).  ? Medicines that treat allergies (antihistamines).  ? A spray that eases inflammation of the nostrils (topical intranasal corticosteroids).  ? Rinses that help get rid of thick mucus in your nose (nasal saline washes).  · If caused by bacteria, your health care provider may recommend waiting to see if your symptoms improve. Most bacterial infections will get better without antibiotic medicine. You may be given antibiotics if you have:  ? A severe infection.  ? A weak immune system.  · If caused by narrow nasal passages or nasal polyps, you may need to have surgery.  Follow these instructions at home:  Medicines  · Take, use, or apply over-the-counter and prescription medicines only as told by your health care provider. These may include nasal sprays.  · If you were prescribed an antibiotic medicine, take it as told by your health care provider. Do not stop taking the antibiotic even if you start to feel better.  Hydrate and humidify    · Drink enough fluid to keep your urine pale yellow. Staying hydrated will help to thin your mucus.  · Use a cool mist humidifier to keep the humidity level in your home above 50%.  · Inhale steam for 10-15 minutes, 3-4 times a day, or as told by your health care provider. You can do this in the bathroom while a hot shower is  running.  · Limit your exposure to cool or dry air.  Rest  · Rest as much as possible.  · Sleep with your head raised (elevated).  · Make sure you get enough sleep each night.  General instructions    · Apply a warm, moist washcloth to your face 3-4 times a day or as told by your health care provider. This will help with discomfort.  · Wash your hands often with soap and water to reduce your exposure to germs. If soap and water are not available, use hand .  · Do not smoke. Avoid being around people who are smoking (secondhand smoke).  · Keep all follow-up visits as told by your health care provider. This is important.  Contact a health care provider if:  · You have a fever.  · Your symptoms get worse.  · Your symptoms do not improve within 10 days.  Get help right away if:  · You have a severe headache.  · You have persistent vomiting.  · You have severe pain or swelling around your face or eyes.  · You have vision problems.  · You develop confusion.  · Your neck is stiff.  · You have trouble breathing.  Summary  · Sinusitis is soreness and inflammation of your sinuses. Sinuses are hollow spaces in the bones around your face.  · This condition is caused by nasal tissues that become inflamed or swollen. The swelling traps or blocks the flow of mucus. This allows bacteria, viruses, and fungi to grow, which leads to infection.  · If you were prescribed an antibiotic medicine, take it as told by your health care provider. Do not stop taking the antibiotic even if you start to feel better.  · Keep all follow-up visits as told by your health care provider. This is important.  This information is not intended to replace advice given to you by your health care provider. Make sure you discuss any questions you have with your health care provider.  Document Revised: 05/20/2019 Document Reviewed: 05/20/2019  [employer] Patient Education © 2021 [employer]  Cefdinir Capsules  What is this medicine?  CEFDINIR (SEF di  ner) is a cephalosporin antibiotic. It treats some infections caused by bacteria. It will not work for colds, the flu, or other viruses.  This medicine may be used for other purposes; ask your health care provider or pharmacist if you have questions.  COMMON BRAND NAME(S): Jostin  What should I tell my health care provider before I take this medicine?  They need to know if you have any of these conditions:  · bleeding problems  · kidney disease  · stomach or intestine problems (especially colitis)  · an unusual or allergic reaction to cefdinir, other cephalosporin antibiotics, penicillin, penicillamine, other foods, dyes or preservatives  · pregnant or trying to get pregnant  · breast-feeding  How should I use this medicine?  Take this drug by mouth. Take it as directed on the prescription label at the same time every day. You can take it with or without food. If it upsets your stomach, take it with food. Take all of this drug unless your health care provider tells you to stop it early. Keep taking it even if you think you are better.  Take products with aluminum, magnesium, or iron in them at a different time of day than this drug. Take this drug 2 hours BEFORE or 2 hours AFTER these products.  Talk to your health care provider about the use of this drug in children. While it may be prescribed for selected conditions, precautions do apply.  Overdosage: If you think you have taken too much of this medicine contact a poison control center or emergency room at once.  NOTE: This medicine is only for you. Do not share this medicine with others.  What if I miss a dose?  If you miss a dose, take it as soon as you can. If it is almost time for your next dose, take only that dose. Do not take double or extra doses.  What may interact with this medicine?  · antacids that contain aluminum or magnesium  · iron supplements  · other antibiotics  · probenecid  This list may not describe all possible interactions. Give your  health care provider a list of all the medicines, herbs, non-prescription drugs, or dietary supplements you use. Also tell them if you smoke, drink alcohol, or use illegal drugs. Some items may interact with your medicine.  What should I watch for while using this medicine?  Tell your doctor or health care provider if your symptoms do not get better in a few days.  This medicine may cause serious skin reactions. They can happen weeks to months after starting the medicine. Contact your health care provider right away if you notice fevers or flu-like symptoms with a rash. The rash may be red or purple and then turn into blisters or peeling of the skin. Or, you might notice a red rash with swelling of the face, lips or lymph nodes in your neck or under your arms.  If you are diabetic you may get a false-positive result for sugar in your urine. Check with your doctor or health care provider before you change your diet or the dose of your diabetes medicine.  What side effects may I notice from receiving this medicine?  Side effects that you should report to your doctor or health care professional as soon as possible:  · allergic reactions like skin rash, itching or hives, swelling of the face, lips, or tongue  · bloody or watery diarrhea  · breathing problems  · fever  · redness, blistering, peeling or loosening of the skin, including inside the mouth  · seizures  · trouble passing urine or change in the amount of urine  · unusual bleeding or bruising  · unusually weak or tired  Side effects that usually do not require medical attention (report to your doctor or health care professional if they continue or are bothersome):  · constipation  · diarrhea  · dizziness  · dry mouth  · headache  · loss of appetite  · nausea, vomiting  · stomach pain  · stool discoloration  · tiredness  · vaginal discharge, itching, or odor in women  This list may not describe all possible side effects. Call your doctor for medical advice about  side effects. You may report side effects to FDA at 8-397-DXQ-6813.  Where should I keep my medicine?  Keep out of the reach of children and pets.  Store at room temperature between 20 and 25 degrees C (68 and 77 degrees F). Throw away any unused drug after the expiration date.  NOTE: This sheet is a summary. It may not cover all possible information. If you have questions about this medicine, talk to your doctor, pharmacist, or health care provider.  © 2021 [employer] (2020-07-22 15:57:02)    Pharyngitis    Pharyngitis is redness, pain, and swelling (inflammation) of the throat (pharynx). It is a very common cause of sore throat. Pharyngitis can be caused by a bacteria, but it is usually caused by a virus. Most cases of pharyngitis get better on their own without treatment.  What are the causes?  This condition may be caused by:  · Infection by viruses (viral). Viral pharyngitis spreads from person to person (is contagious) through coughing, sneezing, and sharing of personal items or utensils such as cups, forks, spoons, and toothbrushes.  · Infection by bacteria (bacterial). Bacterial pharyngitis may be spread by touching the nose or face after coming in contact with the bacteria, or through more intimate contact, such as kissing.  · Allergies. Allergies can cause buildup of mucus in the throat (post-nasal drip), leading to inflammation and irritation. Allergies can also cause blocked nasal passages, forcing breathing through the mouth, which dries and irritates the throat.  What increases the risk?  You are more likely to develop this condition if:  · You are 5-24 years old.  · You are exposed to crowded environments such as , school, or dormitory living.  · You live in a cold climate.  · You have a weakened disease-fighting (immune) system.  What are the signs or symptoms?  Symptoms of this condition vary by the cause (viral, bacterial, or allergies) and can include:  · Sore  throat.  · Fatigue.  · Low-grade fever.  · Headache.  · Joint pain and muscle aches.  · Skin rashes.  · Swollen glands in the throat (lymph nodes).  · Plaque-like film on the throat or tonsils. This is often a symptom of bacterial pharyngitis.  · Vomiting.  · Stuffy nose (nasal congestion).  · Cough.  · Red, itchy eyes (conjunctivitis).  · Loss of appetite.  How is this diagnosed?  This condition is often diagnosed based on your medical history and a physical exam. Your health care provider will ask you questions about your illness and your symptoms. A swab of your throat may be done to check for bacteria (rapid strep test). Other lab tests may also be done, depending on the suspected cause, but these are rare.  How is this treated?  This condition usually gets better in 3-4 days without medicine. Bacterial pharyngitis may be treated with antibiotic medicines.  Follow these instructions at home:  · Take over-the-counter and prescription medicines only as told by your health care provider.  ? If you were prescribed an antibiotic medicine, take it as told by your health care provider. Do not stop taking the antibiotic even if you start to feel better.  ? Do not give children aspirin because of the association with Reye syndrome.  · Drink enough water and fluids to keep your urine clear or pale yellow.  · Get a lot of rest.  · Gargle with a salt-water mixture 3-4 times a day or as needed. To make a salt-water mixture, completely dissolve ½-1 tsp of salt in 1 cup of warm water.  · If your health care provider approves, you may use throat lozenges or sprays to soothe your throat.  Contact a health care provider if:  · You have large, tender lumps in your neck.  · You have a rash.  · You cough up green, yellow-brown, or bloody spit.  Get help right away if:  · Your neck becomes stiff.  · You drool or are unable to swallow liquids.  · You cannot drink or take medicines without vomiting.  · You have severe pain that does  not go away, even after you take medicine.  · You have trouble breathing, and it is not caused by a stuffy nose.  · You have new pain and swelling in your joints such as the knees, ankles, wrists, or elbows.  Summary  · Pharyngitis is redness, pain, and swelling (inflammation) of the throat (pharynx).  · While pharyngitis can be caused by a bacteria, the most common causes are viral.  · Most cases of pharyngitis get better on their own without treatment.  · Bacterial pharyngitis is treated with antibiotic medicines.  This information is not intended to replace advice given to you by your health care provider. Make sure you discuss any questions you have with your health care provider.  Document Revised: 11/30/2018 Document Reviewed: 01/23/2018  [employer] Patient Education © 2021 [employer]  Fluticasone nasal spray  What is this medicine?  FLUTICASONE (floo TIK a sone) is a corticosteroid. This medicine is used to treat the symptoms of allergies like sneezing, itchy red eyes, and itchy, runny, or stuffy nose. This medicine is also used to treat nasal polyps.  This medicine may be used for other purposes; ask your health care provider or pharmacist if you have questions.  COMMON BRAND NAME(S): ClariSpray, Flonase, Flonase Allergy Relief, Flonase Sensimist, Veramyst, XHANCE  What should I tell my health care provider before I take this medicine?  They need to know if you have any of these conditions:  · eye disease, vision problems  · infection, like tuberculosis, herpes, or fungal infection  · recent surgery on nose or sinuses  · taking a corticosteroid by mouth  · an unusual or allergic reaction to fluticasone, steroids, other medicines, foods, dyes, or preservatives  · pregnant or trying to get pregnant  · breast-feeding  How should I use this medicine?  This medicine is for use in the nose. Follow the directions on your product or prescription label. This medicine works best if used at regular intervals. Do not  use more often than directed. Make sure that you are using your nasal spray correctly. After 6 months of daily use for allergies, talk to your doctor or health care professional before using it for a longer time. Ask your doctor or health care professional if you have any questions.  Talk to your pediatrician regarding the use of this medicine in children. Special care may be needed. Some products have been used for allergies in children as young as 2 years. After 2 months of daily use without a prescription in a child, talk to your pediatrician before using it for a longer time. Use of this medicine for nasal polyps is not approved in children.  Overdosage: If you think you have taken too much of this medicine contact a poison control center or emergency room at once.  NOTE: This medicine is only for you. Do not share this medicine with others.  What if I miss a dose?  If you miss a dose, use it as soon as you remember. If it is almost time for your next dose, use only that dose and continue with your regular schedule. Do not use double or extra doses.  What may interact with this medicine?  · certain antibiotics like clarithromycin and telithromycin  · certain medicines for fungal infections like ketoconazole, itraconazole, and voriconazole  · conivaptan  · nefazodone  · some medicines for HIV  · vaccines  This list may not describe all possible interactions. Give your health care provider a list of all the medicines, herbs, non-prescription drugs, or dietary supplements you use. Also tell them if you smoke, drink alcohol, or use illegal drugs. Some items may interact with your medicine.  What should I watch for while using this medicine?  Visit your healthcare professional for regular checks on your progress. Tell your healthcare professional if your symptoms do not start to get better or if they get worse.  This medicine may increase your risk of getting an infection. Tell your doctor or health care professional  if you are around anyone with measles or chickenpox, or if you develop sores or blisters that do not heal properly.  What side effects may I notice from receiving this medicine?  Side effects that you should report to your doctor or health care professional as soon as possible:  · allergic reactions like skin rash, itching or hives, swelling of the face, lips, or tongue  · changes in vision  · crusting or sores in the nose  · nosebleed  · signs and symptoms of infection like fever or chills; cough; sore throat  · white patches or sores in the mouth or nose  Side effects that usually do not require medical attention (report to your doctor or health care professional if they continue or are bothersome):  · burning or irritation inside the nose or throat  · changes in taste or smell  · cough  · headache  This list may not describe all possible side effects. Call your doctor for medical advice about side effects. You may report side effects to FDA at 3-388-FDA-2746.  Where should I keep my medicine?  Keep out of the reach of children.  Store at room temperature between 15 and 30 degrees C (59 and 86 degrees F). Avoid exposure to extreme heat, cold, or light. Throw away any unused medicine after the expiration date.  NOTE: This sheet is a summary. It may not cover all possible information. If you have questions about this medicine, talk to your doctor, pharmacist, or health care provider.  © 2021 [employer] (2019-01-10 14:10:08)  Triamcinolone nasal spray  What is this medicine?  TRIAMCINOLONE (trye am SIN oh lone) nasal spray is a corticosteroid. It is used to treat the nasal symptoms of seasonal and year round allergies.  This medicine may be used for other purposes; ask your health care provider or pharmacist if you have questions.  COMMON BRAND NAME(S): Children's Nasacort Allergy 24HR Nasal [place], Nasacort, Nasacort AQ  What should I tell my health care provider before I take this medicine?  They need to  know if you have any of these conditions:  · infection, like tuberculosis, herpes, or fungal infection  · recent surgery or injury of nose or sinuses  · taking corticosteroids by mouth  · an unusual or allergic reaction to triamcinolone, corticosteroids, other medicines, foods, dyes, or preservatives  · pregnant or trying to get pregnant  · breast-feeding  How should I use this medicine?  This medicine is for use in the nose. Follow the directions on your prescription or product label. This medicine works best if used regularly. Do not use more often than directed. Make sure that you are using your nasal spray correctly. Ask you doctor or health care provider if you have any questions.  Talk to your pediatrician regarding the use of this medicine in children. While this drug may be prescribed for children as young as 2 years of age for selected conditions, precautions do apply. After 2 months of daily use without a prescription in a child, talk to your pediatrician before using it for a longer time.  Overdosage: If you think you have taken too much of this medicine contact a poison control center or emergency room at once.  NOTE: This medicine is only for you. Do not share this medicine with others.  What if I miss a dose?  If you miss a dose, take it as soon as you can. If it is almost time for your next dose, take only that dose. Do not take double or extra doses.  What may interact with this medicine?  Interactions are not expected.  This list may not describe all possible interactions. Give your health care provider a list of all the medicines, herbs, non-prescription drugs, or dietary supplements you use. Also tell them if you smoke, drink alcohol, or use illegal drugs. Some items may interact with your medicine.  What should I watch for while using this medicine?  Check with your doctor or health care professional if your symptoms do not improve in 1 week of regular use or if they get worse.  This medicine may  increase your risk of getting an infection. Tell your doctor or health care professional if you are around anyone with measles or chickenpox, or if you develop sores or blisters that do not heal properly.  What side effects may I notice from receiving this medicine?  Side effects that you should report to your doctor or health care professional as soon as possible:  · allergic reactions like skin rash, itching or hives, swelling of the face, lips, or tongue  · change in vision  · dizziness  · infection  · nosebleed, burning in the nose  · trouble breathing, wheezing  · unusual bruising  · white patches or sores in the nose  Side effects that usually do not require medical attention (report to your doctor or health care professional if they continue or are bothersome):  · congestion  · cough  · headache  · nausea  · runny nose  · sneezing  This list may not describe all possible side effects. Call your doctor for medical advice about side effects. You may report side effects to FDA at 7-688-PGB-3084.  Where should I keep my medicine?  Keep out of the reach of children.  Store at room temperature between 20 and 25 degrees C (68 and 77 degrees F). Throw away any unused medication after the expiration date.  NOTE: This sheet is a summary. It may not cover all possible information. If you have questions about this medicine, talk to your doctor, pharmacist, or health care provider.  © 2021 [employer] (2015-02-17 15:16:57)

## 2021-05-30 NOTE — PROGRESS NOTES
Follow Up Office Note     Patient Name: Eun Majano  : 1980   MRN: [id number]     Chief Complaint:    Chief Complaint   Patient presents with   • Sore Throat   • Sinus Problem     drainage,pain and pressure       History of Present Illness: Eun Majano is a 41 y.o. female who presents today with c/o sinus congestion, post-nasal drainage, sore throat x several days. She denies fever, chills, body aches, loss of taste or smell, known Covid-19 exposure. She has had both Moderna Covid-19 vaccinations in January/February of this year.    Sore Throat   This is a new problem. The current episode started in the past 7 days. The problem has been gradually worsening. There has been no fever. The pain is moderate. Associated symptoms include congestion, ear pain and a plugged ear sensation. Pertinent negatives include no abdominal pain, coughing, diarrhea, ear discharge, shortness of breath, stridor or vomiting. Trouble swallowing: painful swallowing. She has had exposure to strep. She has tried cool liquids for the symptoms. The treatment provided no relief.   Sinus Problem  This is a new problem. The current episode started in the past 7 days. The problem has been gradually worsening since onset. There has been no fever. Associated symptoms include congestion, ear pain, sinus pressure and a sore throat. Pertinent negatives include no chills, coughing, diaphoresis or shortness of breath. Past treatments include oral decongestants (and zyrtec). The treatment provided no relief.        Subjective      Review of Systems:   Review of Systems   Constitutional: Positive for fatigue. Negative for activity change, appetite change, chills, diaphoresis and fever.   HENT: Positive for congestion, ear pain, postnasal drip, sinus pressure, sinus pain and sore throat. Negative for ear discharge. Trouble swallowing: painful swallowing.    Respiratory: Negative for cough, chest tightness, shortness of  "breath and stridor.    Cardiovascular: Negative.    Gastrointestinal: Negative for abdominal pain, diarrhea, nausea and vomiting.   Musculoskeletal: Negative for myalgias.   Skin: Negative for rash.   Neurological: Negative for dizziness.        Past Medical History:   Past Medical History:   Diagnosis Date   • Abnormal Pap smear of cervix     HPV   • Fibroid     RESOLVED LAST PREGNANCY   • Recurrent pregnancy loss, antepartum condition or complication     X 3   • TMJ (dislocation of temporomandibular joint)          Medications:     Current Outpatient Medications:   •  Cetirizine HCl (ZYRTEC ALLERGY) 10 MG capsule, Take  by mouth., Disp: , Rfl:   •  pseudoephedrine (SUDAFED) 30 MG tablet, Take 1 tablet by mouth Every 6 (Six) Hours As Needed for Congestion., Disp: 40 tablet, Rfl: 0  •  cefdinir (OMNICEF) 300 MG capsule, Take 1 capsule by mouth 2 (Two) Times a Day for 10 days., Disp: 20 capsule, Rfl: 0  •  methylPREDNISolone (MEDROL, CARMEN,) 4 MG tablet, Take as directed on package instructions., Disp: 1 each, Rfl: 0  •  mupirocin (BACTROBAN) 2 % ointment, Apply  topically to the appropriate area as directed 2 (Two) Times a Day., Disp: , Rfl: 2    Allergies:   No Known Allergies      Objective     Physical Exam:  Vital Signs:   Vitals:    05/29/21 1313   BP: 102/64   BP Location: Right arm   Patient Position: Sitting   Cuff Size: Adult   Pulse: 60   Resp: 16   Temp: 98.4 °F (36.9 °C)   TempSrc: Temporal   SpO2: 98%   Weight: 90.2 kg (198 lb 12.8 oz)   Height: 151.1 cm (59.5\")   PainSc:   2   PainLoc: Head  Comment: pressure behind eyes     Body mass index is 39.48 kg/m².     Physical Exam  Vitals and nursing note reviewed.   Constitutional:       General: She is not in acute distress.     Appearance: Normal appearance. She is well-developed. She is not ill-appearing, toxic-appearing or diaphoretic.   HENT:      Head: Normocephalic and atraumatic.      Right Ear: External ear normal. A middle ear effusion is present. " Tympanic membrane is bulging. Tympanic membrane is not erythematous.      Left Ear: External ear normal. A middle ear effusion is present. Tympanic membrane is erythematous and bulging.      Nose: Mucosal edema and congestion present.      Right Turbinates: Swollen.      Left Turbinates: Swollen.      Right Sinus: Maxillary sinus tenderness and frontal sinus tenderness present.      Left Sinus: Maxillary sinus tenderness and frontal sinus tenderness present.      Mouth/Throat:      Lips: Pink.      Mouth: Mucous membranes are moist.      Pharynx: Uvula midline. Posterior oropharyngeal erythema (moderate with cobblestoning) present.   Cardiovascular:      Rate and Rhythm: Normal rate and regular rhythm.      Heart sounds: Normal heart sounds.   Pulmonary:      Effort: Pulmonary effort is normal. No respiratory distress.      Breath sounds: Normal breath sounds. No stridor. No wheezing.   Musculoskeletal:      Cervical back: Normal range of motion and neck supple. No rigidity. No muscular tenderness.   Lymphadenopathy:      Cervical: No cervical adenopathy.   Skin:     General: Skin is warm and dry.   Neurological:      Mental Status: She is alert and oriented to person, place, and time.   Psychiatric:         Mood and Affect: Mood normal.         Behavior: Behavior normal. Behavior is cooperative.         Thought Content: Thought content normal.         Judgment: Judgment normal.         Assessment / Plan      Assessment/Plan:   Diagnoses and all orders for this visit:    1. Acute non-recurrent pansinusitis (Primary)  -     cefdinir (OMNICEF) 300 MG capsule; Take 1 capsule by mouth 2 (Two) Times a Day for 10 days.  Dispense: 20 capsule; Refill: 0        -     Continue cetirizine and Sudafed        -     Begin OTC nasal steroid spray such as Flonase or Nasacort daily as directed    2. Sore throat  -     POCT rapid strep A    Lab Results   Component Value Date    RAPSCRN Negative 05/29/2021       Follow Up:   PRN and  at next scheduled appointment(s) with PCP.    Discussed the nature of the medical condition(s) risks, complications, implications, management, safe and proper use of medications. Encouraged medication compliance, and keeping scheduled follow up appointments with me and any other providers.      RTC if symptoms fail to improve, to ER if symptoms worsen.      HGUO Zamora  [facility]       Please note that portions of this note may have been completed with a voice recognition program. Efforts were made to edit the dictations, but occasionally words are mistranscribed.

## 2021-07-16 NOTE — PROGRESS NOTES
Subjective   Eun Majano is a 37 y.o. female.     Cough          {Common H&P Review Areas:08755}    Review of Systems    Objective   Physical Exam    Assessment/Plan   {Assess/PlanSmartLinks:78742}    I personally spent over half of a total *** minutes face to face with the patient in counseling and discussion and/or coordination of care as described above.    Propranolol Counseling:  I discussed with the patient the risks of propranolol including but not limited to low heart rate, low blood pressure, low blood sugar, restlessness and increased cold sensitivity. They should call the office if they experience any of these side effects.

## 2021-08-20 ENCOUNTER — TRANSCRIBE ORDERS (OUTPATIENT)
Dept: ADMINISTRATIVE | Facility: HOSPITAL | Age: 41
End: 2021-08-20

## 2021-08-20 DIAGNOSIS — Z12.31 ENCOUNTER FOR SCREENING MAMMOGRAM FOR MALIGNANT NEOPLASM OF BREAST: Primary | ICD-10-CM

## 2021-09-02 ENCOUNTER — APPOINTMENT (OUTPATIENT)
Dept: MAMMOGRAPHY | Facility: HOSPITAL | Age: 41
End: 2021-09-02

## 2021-09-07 ENCOUNTER — TELEMEDICINE (OUTPATIENT)
Dept: FAMILY MEDICINE CLINIC | Facility: CLINIC | Age: 41
End: 2021-09-07

## 2021-09-07 VITALS — OXYGEN SATURATION: 86 %

## 2021-09-07 DIAGNOSIS — J06.9 ACUTE URI: Primary | ICD-10-CM

## 2021-09-07 DIAGNOSIS — R06.02 SHORTNESS OF BREATH: ICD-10-CM

## 2021-09-07 PROCEDURE — U0003 INFECTIOUS AGENT DETECTION BY NUCLEIC ACID (DNA OR RNA); SEVERE ACUTE RESPIRATORY SYNDROME CORONAVIRUS 2 (SARS-COV-2) (CORONAVIRUS DISEASE [COVID-19]), AMPLIFIED PROBE TECHNIQUE, MAKING USE OF HIGH THROUGHPUT TECHNOLOGIES AS DESCRIBED BY CMS-2020-01-R: HCPCS | Performed by: FAMILY MEDICINE

## 2021-09-07 PROCEDURE — 99213 OFFICE O/P EST LOW 20 MIN: CPT | Performed by: FAMILY MEDICINE

## 2021-09-07 RX ORDER — AZITHROMYCIN 250 MG/1
TABLET, FILM COATED ORAL
Qty: 6 TABLET | Refills: 0 | OUTPATIENT
Start: 2021-09-07 | End: 2022-11-26

## 2021-09-07 NOTE — PROGRESS NOTES
Telemedicine video visit  Consent obtained    Chief Complaint  No chief complaint on file.    Subjective          Eun Majano presents to [facility] for   URI   This is a new problem. The current episode started in the past 7 days. The problem has been unchanged. There has been no fever. Associated symptoms include congestion, coughing, ear pain, rhinorrhea, sinus pain and a sore throat. Pertinent negatives include no chest pain or wheezing. She has tried decongestant and antihistamine for the symptoms. The treatment provided mild relief.   Has had some dyspnea.  Was exposed to + COVID  Pt has had both COVID vaccines.      Objective   Vital Signs:   SpO2 (!) 86%      Review of Systems   Constitutional: Negative for fatigue, fever and unexpected weight change.   HENT: Positive for congestion, ear pain, rhinorrhea, sinus pain and sore throat.    Respiratory: Positive for cough and shortness of breath. Negative for chest tightness and wheezing.    Cardiovascular: Negative for chest pain, palpitations and leg swelling.      Physical Exam  Vitals and nursing note reviewed.   Constitutional:       General: She is not in acute distress.     Appearance: She is well-developed. She is not diaphoretic.   HENT:      Right Ear: External ear normal.      Left Ear: External ear normal.      Nose: Nose normal.   Eyes:      Conjunctiva/sclera: Conjunctivae normal.      Pupils: Pupils are equal, round, and reactive to light.   Neck:      Thyroid: No thyromegaly.   Cardiovascular:      Rate and Rhythm: Normal rate.      Heart sounds: No murmur heard.     Pulmonary:      Effort: No respiratory distress.      Breath sounds: No wheezing.   Abdominal:      General: There is no distension.      Palpations: There is no mass.      Tenderness: There is no abdominal tenderness. There is no guarding or rebound.      Hernia: No hernia is present.   Musculoskeletal:         General: No tenderness.  Normal range of motion.      Cervical back: Normal range of motion and neck supple.   Lymphadenopathy:      Cervical: No cervical adenopathy.   Skin:     General: Skin is warm and dry.      Coloration: Skin is not pale.      Findings: No erythema or rash.   Neurological:      Mental Status: She is alert and oriented to person, place, and time.      Deep Tendon Reflexes: Reflexes are normal and symmetric.   Psychiatric:         Behavior: Behavior normal.         Thought Content: Thought content normal.         Judgment: Judgment normal.        Result Review :                 Assessment and Plan    Problem List Items Addressed This Visit     None      Visit Diagnoses     Acute URI    -  Primary    Relevant Medications    azithromycin (Zithromax Z-Jac) 250 MG tablet    Other Relevant Orders    COVID-19,[facility] ROUTINE, NP/OP SWAB IN TRANSPORT MEDIA OR ESWAB 72 HR TAT - Swab, Nasopharynx    Shortness of breath          Pt sent to ER due to low O2 Sat.    I spent 20 minutes caring for Eun on this date of service.   Follow Up   No follow-ups on file.  Patient was given instructions and counseling regarding her condition or for health maintenance advice. Please see specific information pulled into the AVS if appropriate.

## 2021-09-08 LAB — SARS-COV-2 RNA RESP QL NAA+PROBE: NOT DETECTED

## 2021-09-14 ENCOUNTER — HOSPITAL ENCOUNTER (OUTPATIENT)
Dept: MAMMOGRAPHY | Facility: HOSPITAL | Age: 41
Discharge: HOME | End: 2021-09-14
Admitting: FAMILY MEDICINE

## 2021-09-14 DIAGNOSIS — Z12.31 ENCOUNTER FOR SCREENING MAMMOGRAM FOR MALIGNANT NEOPLASM OF BREAST: ICD-10-CM

## 2021-09-14 PROCEDURE — 77067 SCR MAMMO BI INCL CAD: CPT | Performed by: RADIOLOGY

## 2021-09-14 PROCEDURE — 77067 SCR MAMMO BI INCL CAD: CPT

## 2021-09-14 PROCEDURE — 77063 BREAST TOMOSYNTHESIS BI: CPT

## 2021-09-14 PROCEDURE — 77063 BREAST TOMOSYNTHESIS BI: CPT | Performed by: RADIOLOGY

## 2021-09-21 ENCOUNTER — TELEMEDICINE (OUTPATIENT)
Dept: FAMILY MEDICINE CLINIC | Facility: CLINIC | Age: 41
End: 2021-09-21

## 2021-09-21 ENCOUNTER — HOSPITAL ENCOUNTER (OUTPATIENT)
Dept: GENERAL RADIOLOGY | Facility: HOSPITAL | Age: 41
Discharge: HOME | End: 2021-09-21
Admitting: FAMILY MEDICINE

## 2021-09-21 DIAGNOSIS — J06.9 UPPER RESPIRATORY TRACT INFECTION, UNSPECIFIED TYPE: ICD-10-CM

## 2021-09-21 DIAGNOSIS — R05.9 COUGH: ICD-10-CM

## 2021-09-21 DIAGNOSIS — R05.9 COUGH: Primary | ICD-10-CM

## 2021-09-21 PROCEDURE — 71046 X-RAY EXAM CHEST 2 VIEWS: CPT

## 2021-09-21 PROCEDURE — 99213 OFFICE O/P EST LOW 20 MIN: CPT | Performed by: FAMILY MEDICINE

## 2021-09-21 RX ORDER — GUAIFENESIN AND CODEINE PHOSPHATE 100; 10 MG/5ML; MG/5ML
5 SOLUTION ORAL 3 TIMES DAILY PRN
Qty: 118 ML | Refills: 0 | Status: SHIPPED | OUTPATIENT
Start: 2021-09-21

## 2021-09-21 RX ORDER — CEFUROXIME AXETIL 250 MG/1
250 TABLET ORAL 2 TIMES DAILY
Qty: 20 TABLET | Refills: 0 | OUTPATIENT
Start: 2021-09-21 | End: 2022-11-26

## 2021-09-21 RX ORDER — ALBUTEROL SULFATE 2.5 MG/3ML
2.5 SOLUTION RESPIRATORY (INHALATION) EVERY 4 HOURS PRN
Qty: 60 EACH | Refills: 0 | OUTPATIENT
Start: 2021-09-21 | End: 2022-11-26

## 2021-09-21 RX ORDER — METHYLPREDNISOLONE 4 MG/1
TABLET ORAL
Qty: 1 EACH | Refills: 0 | OUTPATIENT
Start: 2021-09-21 | End: 2022-11-26

## 2021-09-21 NOTE — PROGRESS NOTES
Telemedicine video visit  Consent obtained    Chief Complaint  URI    Subjective          Eun Majano presents to [facility] for   Was seen via video visit 9/8/21. Was instructed to go to ER at that time for low O2 sat. COVID test was negative. Has had 3 negative COVID tests.  [place] [facility] [place] panel ran which showed Rhinovirus.  Was given prednisone and albuterol without relief. Has also started using Albuterol nebulizer.   Has had increased cough and increased mucous. Finished zpack.  O2 sats have been running 98-99.    URI   This is a new problem. The current episode started 1 to 4 weeks ago. The problem has been unchanged. Associated symptoms include congestion, coughing, sinus pain and wheezing. Pertinent negatives include no chest pain. The treatment provided mild relief.       Objective   Vital Signs:   There were no vitals taken for this visit.      Review of Systems   Constitutional: Negative for fatigue, fever and unexpected weight change.   HENT: Positive for congestion and sinus pain.    Respiratory: Positive for cough and wheezing. Negative for chest tightness and shortness of breath.    Cardiovascular: Negative for chest pain, palpitations and leg swelling.     Physical Exam  Vitals and nursing note reviewed.   Constitutional:       Appearance: She is well-developed. She is not diaphoretic.   HENT:      Head: Normocephalic.      Right Ear: External ear normal.      Left Ear: External ear normal.      Nose: Nose normal.      Mouth/Throat:      Pharynx: No oropharyngeal exudate.   Eyes:      Conjunctiva/sclera: Conjunctivae normal.      Pupils: Pupils are equal, round, and reactive to light.   Neck:      Thyroid: No thyromegaly.   Cardiovascular:      Rate and Rhythm: Normal rate and regular rhythm.      Heart sounds: No murmur heard.     Pulmonary:      Effort: Pulmonary effort is normal. No respiratory distress.   Chest:      Chest wall: No  tenderness.   Abdominal:      General: Bowel sounds are normal. There is no distension.      Palpations: There is no mass.      Tenderness: There is no abdominal tenderness. There is no guarding or rebound.   Musculoskeletal:         General: Normal range of motion.      Cervical back: Normal range of motion and neck supple.   Lymphadenopathy:      Cervical: No cervical adenopathy.   Skin:     General: Skin is warm and dry.      Findings: No erythema or rash.   Neurological:      Mental Status: She is alert and oriented to person, place, and time.      Motor: No abnormal muscle tone.      Coordination: Coordination normal.      Deep Tendon Reflexes: Reflexes are normal and symmetric. Reflexes normal.   Psychiatric:         Behavior: Behavior normal.         Thought Content: Thought content normal.         Judgment: Judgment normal.        Result Review :                 Assessment and Plan    Problem List Items Addressed This Visit     None      Visit Diagnoses     Cough    -  Primary    Relevant Medications    methylPREDNISolone (MEDROL) 4 MG dose pack    cefuroxime (CEFTIN) 250 MG tablet    guaiFENesin-codeine (GUAIFENESIN AC) 100-10 MG/5ML liquid    albuterol (PROVENTIL) (2.5 MG/3ML) 0.083% nebulizer solution    Other Relevant Orders    XR Chest 2 View    Upper respiratory tract infection, unspecified type        Relevant Medications    methylPREDNISolone (MEDROL) 4 MG dose pack    cefuroxime (CEFTIN) 250 MG tablet    guaiFENesin-codeine (GUAIFENESIN AC) 100-10 MG/5ML liquid    albuterol (PROVENTIL) (2.5 MG/3ML) 0.083% nebulizer solution    Other Relevant Orders    XR Chest 2 View        I spent 20 minutes caring for Eun on this date of service. This time includes time spent by me in the following activities:preparing for the visit, obtaining and/or reviewing a separately obtained history, performing a medically appropriate examination and/or evaluation , counseling and educating the patient/family/caregiver,  ordering medications, tests, or procedures, documenting information in the medical record, independently interpreting results and communicating that information with the patient/family/caregiver and care coordination  Follow Up   No follow-ups on file.  Patient was given instructions and counseling regarding her condition or for health maintenance advice. Please see specific information pulled into the AVS if appropriate.

## 2022-11-26 ENCOUNTER — TELEPHONE (OUTPATIENT)
Dept: URGENT CARE | Facility: CLINIC | Age: 42
End: 2022-11-26

## 2023-06-16 ENCOUNTER — LAB (OUTPATIENT)
Dept: LAB | Facility: HOSPITAL | Age: 43
End: 2023-06-16
Payer: COMMERCIAL

## 2023-06-16 ENCOUNTER — OFFICE VISIT (OUTPATIENT)
Dept: FAMILY MEDICINE CLINIC | Facility: CLINIC | Age: 43
End: 2023-06-16
Payer: COMMERCIAL

## 2023-06-16 VITALS
HEIGHT: 59 IN | HEART RATE: 69 BPM | TEMPERATURE: 97.1 F | DIASTOLIC BLOOD PRESSURE: 62 MMHG | SYSTOLIC BLOOD PRESSURE: 120 MMHG | WEIGHT: 228.6 LBS | BODY MASS INDEX: 46.08 KG/M2

## 2023-06-16 DIAGNOSIS — Z00.00 ENCOUNTER FOR ANNUAL PHYSICAL EXAM: Primary | ICD-10-CM

## 2023-06-16 DIAGNOSIS — E66.01 OBESITY, CLASS III, BMI 40-49.9 (MORBID OBESITY): ICD-10-CM

## 2023-06-16 DIAGNOSIS — Z12.31 SCREENING MAMMOGRAM FOR BREAST CANCER: ICD-10-CM

## 2023-06-16 DIAGNOSIS — Z00.00 ENCOUNTER FOR ANNUAL PHYSICAL EXAM: ICD-10-CM

## 2023-06-16 LAB
25(OH)D3 SERPL-MCNC: 16.3 NG/ML (ref 30–100)
ALBUMIN SERPL-MCNC: 4.1 G/DL (ref 3.5–5.2)
ALBUMIN/GLOB SERPL: 1.6 G/DL
ALP SERPL-CCNC: 57 U/L (ref 39–117)
ALT SERPL W P-5'-P-CCNC: 20 U/L (ref 1–33)
ANION GAP SERPL CALCULATED.3IONS-SCNC: 11.9 MMOL/L (ref 5–15)
AST SERPL-CCNC: 22 U/L (ref 1–32)
BASOPHILS # BLD AUTO: 0.05 10*3/MM3 (ref 0–0.2)
BASOPHILS NFR BLD AUTO: 1 % (ref 0–1.5)
BILIRUB SERPL-MCNC: 0.4 MG/DL (ref 0–1.2)
BUN SERPL-MCNC: 10 MG/DL (ref 6–20)
BUN/CREAT SERPL: 13.9 (ref 7–25)
CALCIUM SPEC-SCNC: 9.2 MG/DL (ref 8.6–10.5)
CHLORIDE SERPL-SCNC: 106 MMOL/L (ref 98–107)
CHOLEST SERPL-MCNC: 257 MG/DL (ref 0–200)
CO2 SERPL-SCNC: 21.1 MMOL/L (ref 22–29)
CREAT SERPL-MCNC: 0.72 MG/DL (ref 0.57–1)
DEPRECATED RDW RBC AUTO: 42.4 FL (ref 37–54)
EGFRCR SERPLBLD CKD-EPI 2021: 106.5 ML/MIN/1.73
EOSINOPHIL # BLD AUTO: 0.12 10*3/MM3 (ref 0–0.4)
EOSINOPHIL NFR BLD AUTO: 2.4 % (ref 0.3–6.2)
ERYTHROCYTE [DISTWIDTH] IN BLOOD BY AUTOMATED COUNT: 13.1 % (ref 12.3–15.4)
GLOBULIN UR ELPH-MCNC: 2.6 GM/DL
GLUCOSE SERPL-MCNC: 84 MG/DL (ref 65–99)
HBA1C MFR BLD: 5 % (ref 4.8–5.6)
HCT VFR BLD AUTO: 43.6 % (ref 34–46.6)
HCV AB SER DONR QL: NORMAL
HDLC SERPL-MCNC: 45 MG/DL (ref 40–60)
HGB BLD-MCNC: 14.9 G/DL (ref 12–15.9)
IMM GRANULOCYTES # BLD AUTO: 0.02 10*3/MM3 (ref 0–0.05)
IMM GRANULOCYTES NFR BLD AUTO: 0.4 % (ref 0–0.5)
LDLC SERPL CALC-MCNC: 191 MG/DL (ref 0–100)
LDLC/HDLC SERPL: 4.2 {RATIO}
LYMPHOCYTES # BLD AUTO: 1.81 10*3/MM3 (ref 0.7–3.1)
LYMPHOCYTES NFR BLD AUTO: 36.2 % (ref 19.6–45.3)
MCH RBC QN AUTO: 30.4 PG (ref 26.6–33)
MCHC RBC AUTO-ENTMCNC: 34.2 G/DL (ref 31.5–35.7)
MCV RBC AUTO: 89 FL (ref 79–97)
MONOCYTES # BLD AUTO: 0.56 10*3/MM3 (ref 0.1–0.9)
MONOCYTES NFR BLD AUTO: 11.2 % (ref 5–12)
NEUTROPHILS NFR BLD AUTO: 2.44 10*3/MM3 (ref 1.7–7)
NEUTROPHILS NFR BLD AUTO: 48.8 % (ref 42.7–76)
NRBC BLD AUTO-RTO: 0 /100 WBC (ref 0–0.2)
PLATELET # BLD AUTO: 297 10*3/MM3 (ref 140–450)
PMV BLD AUTO: 12.3 FL (ref 6–12)
POTASSIUM SERPL-SCNC: 4.3 MMOL/L (ref 3.5–5.2)
PROT SERPL-MCNC: 6.7 G/DL (ref 6–8.5)
RBC # BLD AUTO: 4.9 10*6/MM3 (ref 3.77–5.28)
SODIUM SERPL-SCNC: 139 MMOL/L (ref 136–145)
TRIGL SERPL-MCNC: 115 MG/DL (ref 0–150)
TSH SERPL DL<=0.05 MIU/L-ACNC: 1.35 UIU/ML (ref 0.27–4.2)
VIT B12 BLD-MCNC: 200 PG/ML (ref 211–946)
VLDLC SERPL-MCNC: 21 MG/DL (ref 5–40)
WBC NRBC COR # BLD: 5 10*3/MM3 (ref 3.4–10.8)

## 2023-06-16 PROCEDURE — 80061 LIPID PANEL: CPT

## 2023-06-16 PROCEDURE — 36415 COLL VENOUS BLD VENIPUNCTURE: CPT

## 2023-06-16 PROCEDURE — 83036 HEMOGLOBIN GLYCOSYLATED A1C: CPT

## 2023-06-16 PROCEDURE — 80050 GENERAL HEALTH PANEL: CPT

## 2023-06-16 PROCEDURE — 82607 VITAMIN B-12: CPT

## 2023-06-16 PROCEDURE — 82306 VITAMIN D 25 HYDROXY: CPT

## 2023-06-16 PROCEDURE — 86803 HEPATITIS C AB TEST: CPT

## 2023-06-16 RX ORDER — SEMAGLUTIDE 0.25 MG/.5ML
0.25 INJECTION, SOLUTION SUBCUTANEOUS WEEKLY
Qty: 1 ML | Refills: 0 | Status: SHIPPED | OUTPATIENT
Start: 2023-06-16 | End: 2023-07-14

## 2023-06-16 NOTE — ASSESSMENT & PLAN NOTE
Patient's (Body mass index is 46.17 kg/m².) indicates that they are morbidly/severely obese (BMI > 40 or > 35 with obesity - related health condition) with health conditions that include none . Weight is worsening. BMI  is above average; BMI management plan is completed. We discussed low calorie, low carb based diet program, portion control, increasing exercise, and pharmacologic options including GLP-1 .     She has failed multiple attempts at weight loss despite calorie restriction/counting, exercising regularly.  Per guidelines, She is a candidate for a GLP-1 agonist to for weight loss due to their BMI.  She has no personal history of pancreatitis, no family history of MEN syndrome or medullary thyroid cancer. Possible side effects including nausea, bloating, other GI upset and rarely pancreatitis were discussed. She was advised to call the office with any symptoms or concerns.

## 2023-06-16 NOTE — PROGRESS NOTES
Subjective     Chief Complaint  well adult exam  ([facility] care)    Subjective          Eun Majano is a 43 y.o. female who presents today to [facility] for initial evaluation .    HPI:   History of Present Illness    This patient is a very pleasant 43-year-old female who presents today to have a annual physical exam and establish care with a primary care physician.    Her past medical history includes history of abnormal Pap smear, TMJ dislocation, uterine fibroid.  It does not appear that she takes any chronic medications.    She reports concern for weight gain/inability to lose weight.  She has been trying for a number of months to lose weight and has been unsuccessful.  She has no history of thyroid cancer no history of men2a syndrome in her family.  She is inquiring about potentially starting Wegovy.    She has a concern also for lower extremity edema.  She recently was on vacation in [place] and a long car resolves faster.  She has no pitting edema noted on exam today.  Discussed pedal pump techniques and wearing compression stockings when on long carrides.      The following portions of the patient's history were reviewed and updated as appropriate: allergies, current medications, past family history, past medical history, past social history, past surgical history and problem list.    Objective     Objective     Allergy:   No Known Allergies     Current Medications:   Current Outpatient Medications   Medication Sig Dispense Refill    albuterol sulfate  (90 Base) MCG/ACT inhaler Inhale 2 puffs Every 4 (Four) Hours As Needed for Wheezing. 6.7 g 0    Cetirizine HCl 10 MG capsule Take  by mouth.      Semaglutide-Weight Management (Wegovy) 0.25 MG/0.5ML solution auto-injector Inject 0.25 mg under the skin into the appropriate area as directed 1 (One) Time Per Week for 28 days. 1 mL 0     No current facility-administered medications for this visit.       Past  "Medical History:  Past Medical History:   Diagnosis Date    Abnormal Pap smear of cervix     HPV    Fibroid     RESOLVED LAST PREGNANCY    Recurrent pregnancy loss, antepartum condition or complication     X 3    TMJ (dislocation of temporomandibular joint)        Past Surgical History:  Past Surgical History:   Procedure Laterality Date    HEMORRHOIDECTOMY      TONSILLECTOMY      WISDOM TOOTH EXTRACTION         Social History:  Social History     Socioeconomic History    Marital status:    Tobacco Use    Smoking status: Never    Smokeless tobacco: Never   Substance and Sexual Activity    Alcohol use: No    Drug use: No    Sexual activity: Defer       Family History:  Family History   Problem Relation Age of Onset    No Known Problems Mother     Cancer Father         bladder    Arthritis Son     Breast cancer Maternal Aunt     Ovarian cancer Neg Hx          Vital Signs:   /62   Pulse 69   Temp 97.1 °F (36.2 °C)   Ht 149.9 cm (59\")   Wt 104 kg (228 lb 9.6 oz)   BMI 46.17 kg/m²      Physical Exam:  Physical Exam  Vitals reviewed.   Constitutional:       Appearance: She is not ill-appearing or diaphoretic.   HENT:      Head: Normocephalic.   Eyes:      Conjunctiva/sclera: Conjunctivae normal.      Pupils: Pupils are equal, round, and reactive to light.   Cardiovascular:      Rate and Rhythm: Normal rate.      Pulses: Normal pulses.      Heart sounds: No murmur heard.  Pulmonary:      Effort: Pulmonary effort is normal.      Breath sounds: Normal breath sounds. No wheezing.   Abdominal:      General: Abdomen is flat.      Palpations: Abdomen is soft.      Tenderness: There is no abdominal tenderness.   Musculoskeletal:      Right lower leg: No edema.      Left lower leg: No edema.   Skin:     Capillary Refill: Capillary refill takes less than 2 seconds.   Neurological:      General: No focal deficit present.      Mental Status: She is alert and oriented to person, place, and time. Mental status is at " baseline.      Cranial Nerves: No cranial nerve deficit.      Motor: No weakness.      Gait: Gait normal.   Psychiatric:         Mood and Affect: Mood normal.         Behavior: Behavior normal.         Thought Content: Thought content normal.         Judgment: Judgment normal.             PHQ-9 Score  PHQ-9 Total Score: 0     Lab Review  No visits with results within 3 Month(s) from this visit.   Latest known visit with results is:   Admission on 11/26/2022, Discharged on 11/26/2022   Component Date Value Ref Range Status    Rapid Influenza A Ag 11/26/2022 Positive (A)   Final    Rapid Influenza B Ag 11/26/2022 Negative   Final    Internal Control 11/26/2022 Passed   Final    Lot Number 11/26/2022 [id number]   Final    Expiration Date 11/26/2022 10-   Final        Radiology Results        Assessment / Plan         Assessment and Plan   Diagnoses and all orders for this visit:    1. Encounter for annual physical exam (Primary)  Assessment & Plan:  Annual wellness exam completed today. Health Maintenance including immunizations was updated and reflected in the chart. Yearly screening labs were ordered.     Further recommendations to be given once lab data received.     Health advice: healthy food choices with fresh fruits and vegetables, maintain sleep pattern at least 8 hours, avoid texting and distracted driving practices; wear safety belt, engage in regular exercise, maintain healthy weight, use safe sex practices, avoid alcohol and illicit drugs. Maintain immunizations that are up to date. Maintain health maintenance: Mammo, PAP, etc.  Follow up with PCP if struggling with depression or anxiety. Keep regular dental and eye exams. Brush and floss teeth daily.     F/U annually and prn.       Orders:  -     CBC & Differential; Future  -     Comprehensive Metabolic Panel; Future  -     Hemoglobin A1c; Future  -     Lipid Panel; Future  -     TSH Rfx On Abnormal To Free T4; Future  -     Vitamin B12; Future  -      Vitamin D,25-Hydroxy; Future  -     Hepatitis C antibody; Future    2. Screening mammogram for breast cancer  -     Mammo Screening Digital Tomosynthesis Bilateral With CAD; Future    3. Obesity, Class III, BMI 40-49.9 (morbid obesity)  Assessment & Plan:  Patient's (Body mass index is 46.17 kg/m².) indicates that they are morbidly/severely obese (BMI > 40 or > 35 with obesity - related health condition) with health conditions that include none . Weight is worsening. BMI  is above average; BMI management plan is completed. We discussed low calorie, low carb based diet program, portion control, increasing exercise, and pharmacologic options including GLP-1 .     She has failed multiple attempts at weight loss despite calorie restriction/counting, exercising regularly.  Per guidelines, She is a candidate for a GLP-1 agonist to for weight loss due to their BMI.  She has no personal history of pancreatitis, no family history of MEN syndrome or medullary thyroid cancer. Possible side effects including nausea, bloating, other GI upset and rarely pancreatitis were discussed. She was advised to call the office with any symptoms or concerns.         Orders:  -     Semaglutide-Weight Management (Wegovy) 0.25 MG/0.5ML solution auto-injector; Inject 0.25 mg under the skin into the appropriate area as directed 1 (One) Time Per Week for 28 days.  Dispense: 1 mL; Refill: 0        Discussed possible differential diagnoses, testing, treatment, recommended non-pharmacological interventions, risks, warning signs to monitor for that would indicate need for follow-up in clinic or ER. If no improvement with these regimens or you have new or worsening symptoms follow-up. Patient verbalizes understanding and agreement with plan of care. Denies further needs or concerns.     Patient was given instructions and counseling regarding her condition and for health maintenance advised.                 Health Maintenance  Health Maintenance:    Health Maintenance Due   Topic Date Due    HEPATITIS C SCREENING  Never done    LIPID PANEL  05/09/2019    PAP SMEAR  07/11/2019    COVID-19 Vaccine (3 - Moderna series) 04/21/2021        Meds ordered during this visit  New Medications Ordered This Visit   Medications    Semaglutide-Weight Management (Wegovy) 0.25 MG/0.5ML solution auto-injector     Sig: Inject 0.25 mg under the skin into the appropriate area as directed 1 (One) Time Per Week for 28 days.     Dispense:  1 mL     Refill:  0       Meds stopped during this visit:  Medications Discontinued During This Encounter   Medication Reason    brompheniramine-pseudoephedrine-DM 30-2-10 MG/5ML syrup *Therapy completed    guaiFENesin-codeine (GUAIFENESIN AC) 100-10 MG/5ML liquid *Therapy completed        Visit Diagnoses    ICD-10-CM ICD-9-CM   1. Encounter for annual physical exam  Z00.00 V70.0   2. Screening mammogram for breast cancer  Z12.31 V76.12   3. Obesity, Class III, BMI 40-49.9 (morbid obesity)  E66.01 278.01       Patient was given instructions and counseling regarding her condition or for health maintenance advice. Please see specific information pulled into the AVS if appropriate.     Follow Up   Return in about 6 weeks (around 7/28/2023) for Recheck.          This document has been electronically signed by Christi Pittman DO   June 16, 2023 10:07 EDT    Dictated Utilizing Dragon Dictation: Part of this note may be an electronic transcription/translation of spoken language to printed text using the Dragon Dictation System.    Christi Pittman D.O.  [facility] [place]

## 2023-06-16 NOTE — ASSESSMENT & PLAN NOTE
Annual wellness exam completed today. Health Maintenance including immunizations was updated and reflected in the chart. Yearly screening labs were ordered.     Further recommendations to be given once lab data received.     Health advice: healthy food choices with fresh fruits and vegetables, maintain sleep pattern at least 8 hours, avoid texting and distracted driving practices; wear safety belt, engage in regular exercise, maintain healthy weight, use safe sex practices, avoid alcohol and illicit drugs. Maintain immunizations that are up to date. Maintain health maintenance: Mammo, PAP, etc.  Follow up with PCP if struggling with depression or anxiety. Keep regular dental and eye exams. Brush and floss teeth daily.     F/U annually and prn.

## 2023-06-19 RX ORDER — ERGOCALCIFEROL 1.25 MG/1
50000 CAPSULE ORAL WEEKLY
Qty: 12 CAPSULE | Refills: 0 | Status: SHIPPED | OUTPATIENT
Start: 2023-06-19

## 2023-06-19 RX ORDER — ATORVASTATIN CALCIUM 20 MG/1
20 TABLET, FILM COATED ORAL DAILY
Qty: 90 TABLET | Refills: 1 | Status: SHIPPED | OUTPATIENT
Start: 2023-06-19

## 2023-06-20 ENCOUNTER — TELEPHONE (OUTPATIENT)
Dept: FAMILY MEDICINE CLINIC | Facility: CLINIC | Age: 43
End: 2023-06-20

## 2023-06-20 NOTE — TELEPHONE ENCOUNTER
THE PATIENT IS CALLING IN ABOUT [facility] SHE STATES THAT SHE HAD THE MEDICATION TRANSFERRED TO [facility] BECAUSE [facility] [address] DID HAVE THE MEDICATION. THE PATIENT STATES THAT [facility] TOLD HER THAT THE MEDICATION WILL NEED A PRIOR AUTHORIZATION SHE TOLD [facility] THAT AND THEY TOLD THE PATIENT THAT THEY WOULD SEND PAPERWORK TO THE OFFICE BUT THEY ADVISED HER TO CALL AS WELL TO LET THE OFFICE KNOW. THE PATIENT WOULD LIKE A CALL TO LET HER KNOW WHEN THE PRIOR AUTHORIZATION IS DONE     CALL [phone]

## 2023-06-21 NOTE — TELEPHONE ENCOUNTER
I called the patient and gave her the below message. She states that she has submitted an appeal with her insurance. She is wanting more information submitted to her insurance for the appeal? She states that she sent you a mychart message about it.

## 2023-06-22 NOTE — TELEPHONE ENCOUNTER
Spoke with the pt, she initiated the appeal herself. So we should receive fax requesting information for the appeal. The information the insurance company gave her to give to us is the same information that was submitted in the original PA. So Im not sure what information is needed to do the appeal

## 2023-07-27 ENCOUNTER — HOSPITAL ENCOUNTER (OUTPATIENT)
Dept: MAMMOGRAPHY | Facility: HOSPITAL | Age: 43
Discharge: HOME | End: 2023-07-27
Admitting: FAMILY MEDICINE
Payer: COMMERCIAL

## 2023-07-27 DIAGNOSIS — Z12.31 SCREENING MAMMOGRAM FOR BREAST CANCER: ICD-10-CM

## 2023-07-27 PROCEDURE — 77067 SCR MAMMO BI INCL CAD: CPT

## 2023-07-27 PROCEDURE — 77063 BREAST TOMOSYNTHESIS BI: CPT

## 2023-08-07 ENCOUNTER — OFFICE VISIT (OUTPATIENT)
Dept: FAMILY MEDICINE CLINIC | Facility: CLINIC | Age: 43
End: 2023-08-07
Payer: COMMERCIAL

## 2023-08-07 VITALS
HEIGHT: 59 IN | BODY MASS INDEX: 44.59 KG/M2 | SYSTOLIC BLOOD PRESSURE: 108 MMHG | DIASTOLIC BLOOD PRESSURE: 70 MMHG | HEART RATE: 72 BPM | TEMPERATURE: 98.4 F | WEIGHT: 221.2 LBS

## 2023-08-07 DIAGNOSIS — M62.838 MUSCLE SPASM: Primary | ICD-10-CM

## 2023-08-07 DIAGNOSIS — M54.9 TRIGGER POINT WITH BACK PAIN: ICD-10-CM

## 2023-08-07 DIAGNOSIS — M54.6 THORACOLUMBAR BACK PAIN: ICD-10-CM

## 2023-08-07 DIAGNOSIS — M54.50 THORACOLUMBAR BACK PAIN: ICD-10-CM

## 2023-08-07 DIAGNOSIS — E66.01 OBESITY, CLASS III, BMI 40-49.9 (MORBID OBESITY): ICD-10-CM

## 2023-08-07 PROCEDURE — 99214 OFFICE O/P EST MOD 30 MIN: CPT | Performed by: FAMILY MEDICINE

## 2023-08-07 NOTE — ASSESSMENT & PLAN NOTE
Patient's (Body mass index is 44.65 kg/mý.) indicates that they are morbidly/severely obese (BMI > 40 or > 35 with obesity - related health condition) with health conditions that include none . Weight is improving with lifestyle modifications. BMI  is above average; BMI management plan is completed. We discussed low calorie, low carb based diet program, portion control, increasing exercise, and pharmacologic options including possibility of Saxenda or Contrave.  Insurance did not cover [facility] .

## 2023-08-07 NOTE — ASSESSMENT & PLAN NOTE
Trigger point noted in the thoracolumbar area at T12/L1.  On the left paraspinal area.  Referral to physical therapy for possible dry needling

## 2023-08-07 NOTE — PROGRESS NOTES
Subjective     Chief Complaint  Cyst (Has a painful knot on spine, noticed Thurs night)    Subjective          Eun Majano is a 43 y.o. female who presents today to [facility] for follow up.    HPI:   History of Present Illness    Ms Majano is a very pleasant 43-year-old female who presents today with a painful knot in her lower back.  Her  first noticed it about 4 days ago while he was massaging her back due to muscle spasms.  She reports the pain is improved now but is still present and painful with palpation.  She has frequent muscle spasms and muscle tightness in her low back.    The following portions of the patient's history were reviewed and updated as appropriate: allergies, current medications, past family history, past medical history, past social history, past surgical history and problem list.    Objective     Objective     Allergy:   No Known Allergies     Current Medications:   Current Outpatient Medications   Medication Sig Dispense Refill    albuterol sulfate  (90 Base) MCG/ACT inhaler Inhale 2 puffs Every 4 (Four) Hours As Needed for Wheezing. 6.7 g 0    Cetirizine HCl 10 MG capsule Take  by mouth.      cyanocobalamin (VITAMIN B-12) 2500 MCG tablet tablet Take 1 tablet by mouth Daily. 30 tablet 5    vitamin D (ERGOCALCIFEROL) 1.25 MG (26025 UT) capsule capsule Take 1 capsule by mouth 1 (One) Time Per Week. 12 capsule 0    atorvastatin (Lipitor) 20 MG tablet Take 1 tablet by mouth Daily. (Patient not taking: Reported on 8/7/2023) 90 tablet 1     No current facility-administered medications for this visit.       Past Medical History:  Past Medical History:   Diagnosis Date    Abnormal Pap smear of cervix     HPV    Fibroid     RESOLVED LAST PREGNANCY    Recurrent pregnancy loss, antepartum condition or complication     X 3    TMJ (dislocation of temporomandibular joint)        Past Surgical History:  Past Surgical History:  "  Procedure Laterality Date    HEMORRHOIDECTOMY      TONSILLECTOMY      WISDOM TOOTH EXTRACTION         Social History:  Social History     Socioeconomic History    Marital status:    Tobacco Use    Smoking status: Never     Passive exposure: Never    Smokeless tobacco: Never   Substance and Sexual Activity    Alcohol use: No    Drug use: No    Sexual activity: Defer       Family History:  Family History   Problem Relation Age of Onset    No Known Problems Mother     Cancer Father         bladder    Arthritis Son     Breast cancer Maternal Aunt         40's or 50's    Ovarian cancer Neg Hx        Vital Signs:   /70   Pulse 72   Temp 98.4 øF (36.9 øC) (Infrared)   Ht 149.9 cm (59.02\")   Wt 100 kg (221 lb 3.2 oz)   BMI 44.65 kg/mý      Physical Exam:  Physical Exam  Constitutional:       Appearance: She is not ill-appearing.   Eyes:      Pupils: Pupils are equal, round, and reactive to light.   Cardiovascular:      Rate and Rhythm: Normal rate.      Pulses: Normal pulses.   Pulmonary:      Effort: Pulmonary effort is normal.      Breath sounds: Normal breath sounds.   Musculoskeletal:      Thoracic back: Spasms present. Normal range of motion.      Lumbar back: Spasms and tenderness present.   Neurological:      General: No focal deficit present.      Mental Status: She is alert. Mental status is at baseline.   Psychiatric:         Mood and Affect: Mood normal.         Behavior: Behavior normal.         Thought Content: Thought content normal.         Judgment: Judgment normal.             PHQ-9 Score  PHQ-9 Total Score:       Lab Review  Lab on 06/16/2023   Component Date Value Ref Range Status    Glucose 06/16/2023 84  65 - 99 mg/dL Final    BUN 06/16/2023 10  6 - 20 mg/dL Final    Creatinine 06/16/2023 0.72  0.57 - 1.00 mg/dL Final    Sodium 06/16/2023 139  136 - 145 mmol/L Final    Potassium 06/16/2023 4.3  3.5 - 5.2 mmol/L Final    Chloride 06/16/2023 106  98 - 107 mmol/L Final    CO2 06/16/2023 " 21.1 (L)  22.0 - 29.0 mmol/L Final    Calcium 06/16/2023 9.2  8.6 - 10.5 mg/dL Final    Total Protein 06/16/2023 6.7  6.0 - 8.5 g/dL Final    Albumin 06/16/2023 4.1  3.5 - 5.2 g/dL Final    ALT (SGPT) 06/16/2023 20  1 - 33 U/L Final    AST (SGOT) 06/16/2023 22  1 - 32 U/L Final    Alkaline Phosphatase 06/16/2023 57  39 - 117 U/L Final    Total Bilirubin 06/16/2023 0.4  0.0 - 1.2 mg/dL Final    Globulin 06/16/2023 2.6  gm/dL Final    A/G Ratio 06/16/2023 1.6  g/dL Final    BUN/Creatinine Ratio 06/16/2023 13.9  7.0 - 25.0 Final    Anion Gap 06/16/2023 11.9  5.0 - 15.0 mmol/L Final    eGFR 06/16/2023 106.5  >60.0 mL/min/1.73 Final    Hemoglobin A1C 06/16/2023 5.00  4.80 - 5.60 % Final    Total Cholesterol 06/16/2023 257 (H)  0 - 200 mg/dL Final    Triglycerides 06/16/2023 115  0 - 150 mg/dL Final    HDL Cholesterol 06/16/2023 45  40 - 60 mg/dL Final    LDL Cholesterol  06/16/2023 191 (H)  0 - 100 mg/dL Final    VLDL Cholesterol 06/16/2023 21  5 - 40 mg/dL Final    LDL/HDL Ratio 06/16/2023 4.20   Final    TSH 06/16/2023 1.350  0.270 - 4.200 uIU/mL Final    Vitamin B-12 06/16/2023 200 (L)  211 - 946 pg/mL Final    25 Hydroxy, Vitamin D 06/16/2023 16.3 (L)  30.0 - 100.0 ng/ml Final    Hepatitis C Ab 06/16/2023 Non-Reactive  Non-Reactive Final    WBC 06/16/2023 5.00  3.40 - 10.80 10*3/mm3 Final    RBC 06/16/2023 4.90  3.77 - 5.28 10*6/mm3 Final    Hemoglobin 06/16/2023 14.9  12.0 - 15.9 g/dL Final    Hematocrit 06/16/2023 43.6  34.0 - 46.6 % Final    MCV 06/16/2023 89.0  79.0 - 97.0 fL Final    MCH 06/16/2023 30.4  26.6 - 33.0 pg Final    MCHC 06/16/2023 34.2  31.5 - 35.7 g/dL Final    RDW 06/16/2023 13.1  12.3 - 15.4 % Final    RDW-SD 06/16/2023 42.4  37.0 - 54.0 fl Final    MPV 06/16/2023 12.3 (H)  6.0 - 12.0 fL Final    Platelets 06/16/2023 297  140 - 450 10*3/mm3 Final    Neutrophil % 06/16/2023 48.8  42.7 - 76.0 % Final    Lymphocyte % 06/16/2023 36.2  19.6 - 45.3 % Final    Monocyte % 06/16/2023 11.2  5.0 - 12.0 %  Final    Eosinophil % 06/16/2023 2.4  0.3 - 6.2 % Final    Basophil % 06/16/2023 1.0  0.0 - 1.5 % Final    Immature Grans % 06/16/2023 0.4  0.0 - 0.5 % Final    Neutrophils, Absolute 06/16/2023 2.44  1.70 - 7.00 10*3/mm3 Final    Lymphocytes, Absolute 06/16/2023 1.81  0.70 - 3.10 10*3/mm3 Final    Monocytes, Absolute 06/16/2023 0.56  0.10 - 0.90 10*3/mm3 Final    Eosinophils, Absolute 06/16/2023 0.12  0.00 - 0.40 10*3/mm3 Final    Basophils, Absolute 06/16/2023 0.05  0.00 - 0.20 10*3/mm3 Final    Immature Grans, Absolute 06/16/2023 0.02  0.00 - 0.05 10*3/mm3 Final    nRBC 06/16/2023 0.0  0.0 - 0.2 /100 WBC Final        Radiology Results  Mammo Screening Digital Tomosynthesis Bilateral With CAD    Result Date: 7/31/2023  Impression: No mammographic findings suspicious for malignancy.  RECOMMENDATION:  Continue annual screening mammography.  BI-RADS CATEGORY 1, NEGATIVE.   CAD was utilized.  The standard false-negative rate of mammography is between 10% and 25%. Complex patterns or increased breast density will markedly elevate the false-negative rate of mammography.   A letter, in lay terminology, with the results of this exam will be mailed to the patient.   This report was finalized on 7/31/2023 12:02 PM by Dr. Charleen Holguin MD.        Assessment / Plan         Assessment and Plan   Diagnoses and all orders for this visit:    1. Muscle spasm (Primary)  -     Ambulatory Referral to Physical Therapy    2. Trigger point with back pain  Assessment & Plan:  Trigger point noted in the thoracolumbar area at T12/L1.  On the left paraspinal area.  Referral to physical therapy for possible dry needling    Orders:  -     Ambulatory Referral to Physical Therapy    3. Thoracolumbar back pain  -     Ambulatory Referral to Physical Therapy    4. Obesity, Class III, BMI 40-49.9 (morbid obesity)  Assessment & Plan:  Patient's (Body mass index is 44.65 kg/mý.) indicates that they are morbidly/severely obese (BMI > 40 or > 35 with  obesity - related health condition) with health conditions that include none . Weight is improving with lifestyle modifications. BMI  is above average; BMI management plan is completed. We discussed low calorie, low carb based diet program, portion control, increasing exercise, and pharmacologic options including possibility of Saxenda or Contrave.  Insurance did not cover Wegovy .           Discussed possible differential diagnoses, testing, treatment, recommended non-pharmacological interventions, risks, warning signs to monitor for that would indicate need for follow-up in clinic or ER. If no improvement with these regimens or you have new or worsening symptoms follow-up. Patient verbalizes understanding and agreement with plan of care. Denies further needs or concerns.     Patient was given instructions and counseling regarding her condition and for health maintenance advised.            Health Maintenance  Health Maintenance:   Health Maintenance Due   Topic Date Due    PAP SMEAR  07/11/2019        Meds ordered during this visit  No orders of the defined types were placed in this encounter.      Meds stopped during this visit:  There are no discontinued medications.     Visit Diagnoses    ICD-10-CM ICD-9-CM   1. Muscle spasm  M62.838 728.85   2. Trigger point with back pain  M54.9 724.5   3. Thoracolumbar back pain  M54.50 724.2    M54.6    4. Obesity, Class III, BMI 40-49.9 (morbid obesity)  E66.01 278.01       Patient was given instructions and counseling regarding her condition or for health maintenance advice. Please see specific information pulled into the AVS if appropriate.     Follow Up   No follow-ups on file.          This document has been electronically signed by Christi Pittman DO   August 7, 2023 11:56 EDT    Dictated Utilizing Dragon Dictation: Part of this note may be an electronic transcription/translation of spoken language to printed text using the Dragon Dictation System.    Christi Pittman,  PRASHANT  [facility] [place]

## 2023-08-10 ENCOUNTER — TREATMENT (OUTPATIENT)
Dept: PHYSICAL THERAPY | Facility: CLINIC | Age: 43
End: 2023-08-10
Payer: COMMERCIAL

## 2023-08-10 DIAGNOSIS — G89.29 CHRONIC BILATERAL LOW BACK PAIN, UNSPECIFIED WHETHER SCIATICA PRESENT: Primary | ICD-10-CM

## 2023-08-10 DIAGNOSIS — R53.1 WEAKNESS: ICD-10-CM

## 2023-08-10 DIAGNOSIS — M54.50 CHRONIC BILATERAL LOW BACK PAIN, UNSPECIFIED WHETHER SCIATICA PRESENT: Primary | ICD-10-CM

## 2023-08-10 PROCEDURE — 97161 PT EVAL LOW COMPLEX 20 MIN: CPT | Performed by: PHYSICAL THERAPIST

## 2023-08-10 PROCEDURE — 97110 THERAPEUTIC EXERCISES: CPT | Performed by: PHYSICAL THERAPIST

## 2023-08-10 NOTE — PROGRESS NOTES
"    Physical Therapy Initial Evaluation and Plan of Care  [facility] [place]  [address], [address]  [place] [address]    Patient: Eun Majano   : 1980  Diagnosis/ICD-10 Code:  Chronic bilateral low back pain, unspecified whether sciatica present [M54.50, G89.29]  Referring practitioner: Christi Pittman DO  Date of Initial Visit: 8/10/2023  Today's Date: 8/10/2023  Patient seen for 1 session         Visit Diagnoses:    ICD-10-CM ICD-9-CM   1. Chronic bilateral low back pain, unspecified whether sciatica present  M54.50 724.2    G89.29 338.29   2. Weakness  R53.1 780.79         Subjective Questionnaire: Oswestry:       Subjective Evaluation    History of Present Illness  Mechanism of injury: The pt stated that her  found a \"knot\" in the L side of the thoracolumbar spine. She was concerned about it and made an appt with her PCP, who felt it was muscular. She has had chronic low back pain since having kids 8 years ago. She also reported a long history of thoracic back pain that she attributed to being heavy chested.     Low back pain is worsened with prolonged sitting and laying and is improved with change in position and moving. She gets intermittent N/T in the L LE when laying too long. Mid back pain is worsened with increased activity like lifting and throwing. She gets some improvement with NSAIDs and with massage. She denied seeking care for back pain before. She would like to improve her pain with PT.       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 - gets some back pain with prolonged sitting Pain  Current pain ratin  At best pain ratin  At worst pain rating: 10  Location: LBP  Quality: dull ache, throbbing and sharp  Relieving factors: medications and heat    Social Support  Lives with: spouse and young children    Hand dominance: right    Diagnostic Tests  No diagnostic tests performed    Treatments  No previous or " "current treatments  Patient Goals  Patient goals for therapy: decreased pain           Objective          Postural Observations    Additional Postural Observation Details  Neutral spine position in TL spine    Palpation   Left   Hypertonic in the erector spinae and lumbar paraspinals.   Tenderness of the erector spinae and lumbar paraspinals.     Right   Hypertonic in the erector spinae and lumbar paraspinals. Tenderness of the erector spinae and lumbar paraspinals.     Additional Palpation Details  Palpable nodule in L paraspinals at level of T11    Tenderness     Lumbar Spine  Tenderness in the spinous process and facet joint.     Additional Tenderness Details  Superficial and non-specific TTP through the thoracolumbar spinous processes    Neurological Testing     Sensation     Lumbar   Left   Intact: light touch    Right   Intact: light touch    Active Range of Motion     Additional Active Range of Motion Details  Lumbar flexion: 0 cm to the floor - \"stretch\" in low back  Lumbar extension: 80% - low back pain  R Lateral flexion: 5 cm past KJL  L Lateral flexion: 5 cm past KJL  R Rotation: 45 deg  L Rotation: 50 deg    Strength/Myotome Testing     Left Hip   Planes of Motion   Flexion: 4  Extension: 4  Abduction: 4  External rotation: 5    Right Hip   Planes of Motion   Flexion: 4+  Extension: 4  Abduction: 4  External rotation: 5    Left Knee   Flexion: 5  Extension: 5    Right Knee   Flexion: 5  Extension: 5    Additional Strength Details  PPT with leg lower - loss of PPT at 20 deg from neutral     Tests     Additional Tests Details  Contralateral back pain with active and passive SLR bilaterally         Assessment & Plan     Assessment  Impairments: abnormal muscle firing, abnormal or restricted ROM, activity intolerance, impaired physical strength, lacks appropriate home exercise program and pain with function  Functional Limitations: carrying objects, lifting, walking, uncomfortable because of pain, stooping " and unable to perform repetitive tasks  Assessment details: The patient is a 42 yo female who presented to PT with evolving characteristics of chronic low back pain and thoracic back pain with low complexity. Signs and symptoms are consistent with nonspecific back pain with core instability. She is concerned about a palpable nodule in the L thoracolumbar paraspinals. This does not seem muscular in nature to me as it is too firm and localized. She was encouraged to ice and use anti-inflammatory creams for 1-2 weeks. If no improvement, she will benefit from return to PCP. Back pain is chronic and is not expected to improve rapidly. However, it was reproduced in the clinic with any strength testing that required core stability, and she should respond well to a stabilization exercise protocol. I expect the patient to make a timely recovery with skilled PT intervention.     Prognosis: good    Goals  Plan Goals: Short Term Goals (4 weeks):     1. The patient will be independent and compliant with initial HEP.     2. The patient will report pain at rest 2/10 or less and worst pain 6/10 or less.    3. The patient will display decreased TTP in the lumbar spine and dec mm tension in the surrounding musculature.    4. B hip strength will improve to 4+/5 in abd and ext.     5. ADAN will improve by 6 points or more.       Long Term Goals (8 weeks):     1. The patient will be appropriate for independent management and compliant with progressed HEP.     2. The patient will report pain at rest 1/10 or less and worst pain 3/10 or less.    3. The patient will return to work duties and/or ADLs with no limitations due to LBP.     4. The patient will return to recreational and community activities with no limitations due to LBP.       Plan  Therapy options: will be seen for skilled therapy services  Planned modality interventions: cryotherapy, electrical stimulation/Russian stimulation, TENS, iontophoresis, thermotherapy (hydrocollator  packs) and traction  Planned therapy interventions: abdominal trunk stabilization, manual therapy, motor coordination training, neuromuscular re-education, ADL retraining, body mechanics training, flexibility, functional ROM exercises, home exercise program, joint mobilization, postural training, soft tissue mobilization, spinal/joint mobilization, strengthening, stretching and therapeutic activities  Frequency: 1x week  Duration in visits: 8  Duration in weeks: 8  Treatment plan discussed with: patient  Plan details: The patient will likely benefit from TE/NMED to include postural reeducation and core strengthening, MT for improved joint mobility, and TA for activity modification and lifting mechanics. Modalities will be utilized as needed for pain modulation. Dry needling as indicated.       History # of Personal Factors and/or Comorbidities: LOW (0)  Examination of Body System(s): # of elements: LOW (1-2)  Clinical Presentation: EVOLVING  Clinical Decision Making: LOW       Timed:         Manual Therapy:    0     mins  80339;     Therapeutic Exercise:    10     mins  98364;     Neuromuscular Vicki:    0    mins  12690;    Therapeutic Activity:     0     mins  46299;     Gait Trainin     mins  27543;     Ultrasound:     0     mins  62080;    Ionto                               0    mins   83875  Self Care                       0     mins   29500  Canalith Repos    0     mins 55364      Un-Timed:  Electrical Stimulation:    0     mins  07534 (MC );  Dry Needling     0     mins self-pay  Traction     0     mins 21748  Low Eval     30     Mins  36629  Mod Eval     0     Mins  34714  High Eval                       0     Mins  57795        Timed Treatment:   10   mins   Total Treatment:     40   mins          PT: Everardo Leach PT     License Number: [id number]  Electronically signed by Everardo Leach PT, 08/10/23, 4:07 PM EDT    Certification Period: 8/10/2023 thru 2023  I certify that the therapy services  are furnished while this patient is under my care.  The services outlined above are required by this patient, and will be reviewed every 90 days.         Physician Signature:__________________________________________________    PHYSICIAN: Christi Pittman DO  NPI: [id number]                                      DATE:      Please sign and return via fax to [email or web address] . Thank you, [facility].

## 2023-09-11 RX ORDER — ERGOCALCIFEROL 1.25 MG/1
CAPSULE ORAL
Qty: 12 CAPSULE | Refills: 0 | Status: SHIPPED | OUTPATIENT
Start: 2023-09-11

## 2023-10-02 ENCOUNTER — OFFICE VISIT (OUTPATIENT)
Dept: FAMILY MEDICINE CLINIC | Facility: CLINIC | Age: 43
End: 2023-10-02
Payer: COMMERCIAL

## 2023-10-02 VITALS
TEMPERATURE: 98.6 F | HEIGHT: 59 IN | DIASTOLIC BLOOD PRESSURE: 86 MMHG | WEIGHT: 221.4 LBS | BODY MASS INDEX: 44.64 KG/M2 | SYSTOLIC BLOOD PRESSURE: 128 MMHG

## 2023-10-02 DIAGNOSIS — J40 BRONCHITIS: ICD-10-CM

## 2023-10-02 DIAGNOSIS — J30.1 SEASONAL ALLERGIC RHINITIS DUE TO POLLEN: ICD-10-CM

## 2023-10-02 DIAGNOSIS — R05.9 COUGH, UNSPECIFIED TYPE: Primary | ICD-10-CM

## 2023-10-02 LAB
EXPIRATION DATE: NORMAL
INTERNAL CONTROL: NORMAL
Lab: NORMAL
SARS-COV-2 AG UPPER RESP QL IA.RAPID: NOT DETECTED

## 2023-10-02 PROCEDURE — 87426 SARSCOV CORONAVIRUS AG IA: CPT | Performed by: FAMILY MEDICINE

## 2023-10-02 PROCEDURE — 99214 OFFICE O/P EST MOD 30 MIN: CPT | Performed by: FAMILY MEDICINE

## 2023-10-02 RX ORDER — METHYLPREDNISOLONE 4 MG/1
TABLET ORAL
Qty: 21 TABLET | Refills: 0 | Status: SHIPPED | OUTPATIENT
Start: 2023-10-02

## 2023-10-02 RX ORDER — AZITHROMYCIN 250 MG/1
TABLET, FILM COATED ORAL
Qty: 6 TABLET | Refills: 0 | Status: SHIPPED | OUTPATIENT
Start: 2023-10-02

## 2023-10-02 RX ORDER — MONTELUKAST SODIUM 10 MG/1
10 TABLET ORAL NIGHTLY
Qty: 30 TABLET | Refills: 2 | Status: SHIPPED | OUTPATIENT
Start: 2023-10-02

## 2023-10-02 NOTE — PROGRESS NOTES
Subjective     Chief Complaint  Cough (Productive cough, chest pain from the coughing. nasal congestion. No fever, bodyaches or chills)    Subjective          Eun Majano is a 43 y.o. female who presents today to [facility] for initial evaluation .    HPI:   Cough  This is a new problem. The current episode started in the past 7 days. The problem has been waxing and waning. The cough is Productive of sputum. Associated symptoms include nasal congestion, postnasal drip, rhinorrhea, shortness of breath and wheezing. Pertinent negatives include no chills, fever or hemoptysis. The symptoms are aggravated by pollens. She has tried rest and a beta-agonist inhaler for the symptoms. The treatment provided mild relief. Her past medical history is significant for environmental allergies.         The following portions of the patient's history were reviewed and updated as appropriate: allergies, current medications, past family history, past medical history, past social history, past surgical history and problem list.    Objective     Objective     Allergy:   No Known Allergies     Current Medications:   Current Outpatient Medications   Medication Sig Dispense Refill    albuterol sulfate  (90 Base) MCG/ACT inhaler Inhale 2 puffs Every 4 (Four) Hours As Needed for Wheezing. 6.7 g 0    Cetirizine HCl 10 MG capsule Take  by mouth.      cyanocobalamin (VITAMIN B-12) 2500 MCG tablet tablet Take 1 tablet by mouth Daily. 30 tablet 5    vitamin D (ERGOCALCIFEROL) 1.25 MG (05350 UT) capsule capsule TAKE 1 CAPSULE BY MOUTH 1 TIME EVERY WEEK 12 capsule 0    azithromycin (Zithromax Z-Jac) 250 MG tablet Take 2 tablets by mouth on day 1, then 1 tablet daily on days 2-5 6 tablet 0    methylPREDNISolone (MEDROL) 4 MG dose pack Take as directed on package instructions. 21 tablet 0    montelukast (Singulair) 10 MG tablet Take 1 tablet by mouth Every Night. 30 tablet 2     No current  "facility-administered medications for this visit.       Past Medical History:  Past Medical History:   Diagnosis Date    Abnormal Pap smear of cervix     HPV    Fibroid     RESOLVED LAST PREGNANCY    Recurrent pregnancy loss, antepartum condition or complication     X 3    TMJ (dislocation of temporomandibular joint)        Past Surgical History:  Past Surgical History:   Procedure Laterality Date    HEMORRHOIDECTOMY      TONSILLECTOMY      WISDOM TOOTH EXTRACTION         Social History:  Social History     Socioeconomic History    Marital status:    Tobacco Use    Smoking status: Never     Passive exposure: Never    Smokeless tobacco: Never   Substance and Sexual Activity    Alcohol use: No    Drug use: No    Sexual activity: Defer       Family History:  Family History   Problem Relation Age of Onset    No Known Problems Mother     Cancer Father         bladder    Arthritis Son     Breast cancer Maternal Aunt         40's or 50's    Ovarian cancer Neg Hx        Review of Systems:  Review of Systems   Constitutional:  Negative for chills and fever.   HENT:  Positive for postnasal drip and rhinorrhea.    Respiratory:  Positive for cough, shortness of breath and wheezing. Negative for hemoptysis.    Allergic/Immunologic: Positive for environmental allergies.     Vital Signs:   /86   Temp 98.6 °F (37 °C) (Infrared)   Ht 149.9 cm (59.02\")   Wt 100 kg (221 lb 6.4 oz)   BMI 44.69 kg/m²      Physical Exam:  Physical Exam  Constitutional:       Appearance: She is not ill-appearing.   Eyes:      Pupils: Pupils are equal, round, and reactive to light.   Cardiovascular:      Rate and Rhythm: Normal rate.      Pulses: Normal pulses.   Pulmonary:      Effort: Pulmonary effort is normal.      Breath sounds: Normal breath sounds.   Neurological:      General: No focal deficit present.      Mental Status: She is alert. Mental status is at baseline.             PHQ-9 Score  PHQ-9 Total Score:       Lab " Review  Office Visit on 10/02/2023   Component Date Value Ref Range Status    SARS Antigen 10/02/2023 Not Detected  Not Detected, Presumptive Negative Final    Internal Control 10/02/2023 Passed  Passed Final    Lot Number 10/02/2023 [id number]   Final    Expiration Date 10/02/2023 4/2/24   Final        Radiology Results        Assessment / Plan         Assessment and Plan   Diagnoses and all orders for this visit:    1. Cough, unspecified type (Primary)  -     POCT VERITOR SARS-CoV-2 Antigen    2. Seasonal allergic rhinitis due to pollen  -     montelukast (Singulair) 10 MG tablet; Take 1 tablet by mouth Every Night.  Dispense: 30 tablet; Refill: 2    3. Bronchitis  -     methylPREDNISolone (MEDROL) 4 MG dose pack; Take as directed on package instructions.  Dispense: 21 tablet; Refill: 0  -     azithromycin (Zithromax Z-Jac) 250 MG tablet; Take 2 tablets by mouth on day 1, then 1 tablet daily on days 2-5  Dispense: 6 tablet; Refill: 0    COVID-negative in office today  Prescribing Medrol Dosepak and Z-Jac for bronchitis.    I suspect possible reactive airway/asthma triggered by allergies.      Discussed possible differential diagnoses, testing, treatment, recommended non-pharmacological interventions, risks, warning signs to monitor for that would indicate need for follow-up in clinic or ER. If no improvement with these regimens or you have new or worsening symptoms follow-up. Patient verbalizes understanding and agreement with plan of care. Denies further needs or concerns.     Patient was given instructions and counseling regarding her condition and for health maintenance advised.                Health Maintenance  Health Maintenance:   Health Maintenance Due   Topic Date Due    PAP SMEAR  07/11/2019    INFLUENZA VACCINE  08/01/2023        Meds ordered during this visit  New Medications Ordered This Visit   Medications    montelukast (Singulair) 10 MG tablet     Sig: Take 1 tablet by mouth Every Night.      Dispense:  30 tablet     Refill:  2    methylPREDNISolone (MEDROL) 4 MG dose pack     Sig: Take as directed on package instructions.     Dispense:  21 tablet     Refill:  0    azithromycin (Zithromax Z-Jac) 250 MG tablet     Sig: Take 2 tablets by mouth on day 1, then 1 tablet daily on days 2-5     Dispense:  6 tablet     Refill:  0       Meds stopped during this visit:  Medications Discontinued During This Encounter   Medication Reason    atorvastatin (Lipitor) 20 MG tablet         Visit Diagnoses    ICD-10-CM ICD-9-CM   1. Cough, unspecified type  R05.9 786.2   2. Seasonal allergic rhinitis due to pollen  J30.1 477.0   3. Bronchitis  J40 490       Patient was given instructions and counseling regarding her condition or for health maintenance advice. Please see specific information pulled into the AVS if appropriate.     Follow Up   No follow-ups on file.          This document has been electronically signed by Christi Pittman DO   October 2, 2023 10:35 EDT    Dictated Utilizing Dragon Dictation: Part of this note may be an electronic transcription/translation of spoken language to printed text using the Dragon Dictation System.    Christi Pittman D.O.  [facility]

## 2024-04-08 ENCOUNTER — OFFICE VISIT (OUTPATIENT)
Dept: FAMILY MEDICINE CLINIC | Facility: CLINIC | Age: 44
End: 2024-04-08
Payer: COMMERCIAL

## 2024-04-08 VITALS
BODY MASS INDEX: 46.53 KG/M2 | HEART RATE: 83 BPM | WEIGHT: 230.8 LBS | DIASTOLIC BLOOD PRESSURE: 84 MMHG | SYSTOLIC BLOOD PRESSURE: 124 MMHG | HEIGHT: 59 IN | OXYGEN SATURATION: 98 % | TEMPERATURE: 99.1 F

## 2024-04-08 DIAGNOSIS — J02.9 SORE THROAT: ICD-10-CM

## 2024-04-08 DIAGNOSIS — J45.21 MILD INTERMITTENT REACTIVE AIRWAY DISEASE WITH ACUTE EXACERBATION: ICD-10-CM

## 2024-04-08 DIAGNOSIS — D17.79 LIPOMA OF OTHER SPECIFIED SITES: ICD-10-CM

## 2024-04-08 DIAGNOSIS — J40 BRONCHITIS: Primary | ICD-10-CM

## 2024-04-08 PROBLEM — D17.9 LIPOMA: Status: ACTIVE | Noted: 2024-04-08

## 2024-04-08 LAB
EXPIRATION DATE: NORMAL
FLUAV AG NPH QL: NEGATIVE
FLUBV AG NPH QL: NEGATIVE
INTERNAL CONTROL: NORMAL
Lab: NORMAL
S PYO AG THROAT QL: NEGATIVE
SARS-COV-2 AG UPPER RESP QL IA.RAPID: NOT DETECTED

## 2024-04-08 PROCEDURE — 87880 STREP A ASSAY W/OPTIC: CPT | Performed by: FAMILY MEDICINE

## 2024-04-08 PROCEDURE — 99214 OFFICE O/P EST MOD 30 MIN: CPT | Performed by: FAMILY MEDICINE

## 2024-04-08 PROCEDURE — 87804 INFLUENZA ASSAY W/OPTIC: CPT | Performed by: FAMILY MEDICINE

## 2024-04-08 PROCEDURE — 87426 SARSCOV CORONAVIRUS AG IA: CPT | Performed by: FAMILY MEDICINE

## 2024-04-08 RX ORDER — FLUTICASONE PROPIONATE 110 UG/1
1 AEROSOL, METERED RESPIRATORY (INHALATION)
Qty: 12 G | Refills: 11 | Status: SHIPPED | OUTPATIENT
Start: 2024-04-08

## 2024-04-08 RX ORDER — ALBUTEROL SULFATE 90 UG/1
2 AEROSOL, METERED RESPIRATORY (INHALATION) EVERY 4 HOURS PRN
Qty: 8 G | Refills: 5 | Status: SHIPPED | OUTPATIENT
Start: 2024-04-08

## 2024-04-08 RX ORDER — METHYLPREDNISOLONE 4 MG/1
TABLET ORAL
Qty: 21 TABLET | Refills: 0 | Status: SHIPPED | OUTPATIENT
Start: 2024-04-08

## 2024-04-08 RX ORDER — AZITHROMYCIN 250 MG/1
TABLET, FILM COATED ORAL
Qty: 6 TABLET | Refills: 0 | Status: SHIPPED | OUTPATIENT
Start: 2024-04-08

## 2024-04-08 NOTE — ASSESSMENT & PLAN NOTE
Postviral bronchitis with likely increased symptoms due to reactive airway.  Starting patient on a daily inhaler of fluticasone and albuterol as needed.  She has taken fluticasone inhalers in the past and this helped her symptoms.  For the acute symptoms I will prescribe Zithromax and Medrol Dosepak.

## 2024-04-08 NOTE — PROGRESS NOTES
"      Subjective     Chief Complaint  Sore Throat, Fever, and Facial Congestion (Since Sunday.)    Subjective          Eun Majano is a 44 y.o. female who presents today to [facility] for initial evaluation .    HPI:   Sore Throat     Fever   Associated symptoms include a sore throat.       Ms. Majano is a pleasant 44 year old female who presents today with complaint of sinus congestion and rhinorrhea. These symptoms started just yesterday. She does note that she had COVID about three weeks ago. She has seasonal allergies and takes zyrtec daily. She reports that she has had increased wheezing since COVID. Her son has asthma and has extra albuterol and has used this and this helped with the wheezing and cough. It has been assumed in the past that she could have asthma but has not been officially diagnosed. At minimum. She has reactive airway syndrome.     Also, of note she had a \"knot\" on her back.  At the initial visit for this in August of last year I felt it was probably a muscle spasm or trigger point.  Advised patient to follow-up with PT.  PT did not feel that this was the case and suggested follow-up.  It is area is still present and unchanged.    The following portions of the patient's history were reviewed and updated as appropriate: allergies, current medications, past family history, past medical history, past social history, past surgical history and problem list.    Objective     Objective     Allergy:   No Known Allergies     Current Medications:   Current Outpatient Medications   Medication Sig Dispense Refill    albuterol sulfate  (90 Base) MCG/ACT inhaler Inhale 2 puffs Every 4 (Four) Hours As Needed for Wheezing. 6.7 g 0    azithromycin (Zithromax Z-Jac) 250 MG tablet Take 2 tablets by mouth on day 1, then 1 tablet daily on days 2-5 6 tablet 0    Cetirizine HCl 10 MG capsule Take  by mouth.      cyanocobalamin (VITAMIN B-12) 2500 MCG tablet " "tablet Take 1 tablet by mouth Daily. 30 tablet 5    methylPREDNISolone (MEDROL) 4 MG dose pack Take as directed on package instructions. 21 tablet 0    vitamin D (ERGOCALCIFEROL) 1.25 MG (74128 UT) capsule capsule TAKE 1 CAPSULE BY MOUTH 1 TIME EVERY WEEK 12 capsule 0    albuterol sulfate  (90 Base) MCG/ACT inhaler Inhale 2 puffs Every 4 (Four) Hours As Needed for Wheezing. 8 g 5    fluticasone (Flovent HFA) 110 MCG/ACT inhaler Inhale 1 puff 2 (Two) Times a Day. 12 g 11     No current facility-administered medications for this visit.       Past Medical History:  Past Medical History:   Diagnosis Date    Abnormal Pap smear of cervix     HPV    Fibroid     RESOLVED LAST PREGNANCY    Recurrent pregnancy loss, antepartum condition or complication     X 3    TMJ (dislocation of temporomandibular joint)        Past Surgical History:  Past Surgical History:   Procedure Laterality Date    HEMORRHOIDECTOMY      TONSILLECTOMY      WISDOM TOOTH EXTRACTION         Social History:  Social History     Socioeconomic History    Marital status:    Tobacco Use    Smoking status: Never     Passive exposure: Never    Smokeless tobacco: Never   Vaping Use    Vaping status: Never Used    Passive vaping exposure: Yes   Substance and Sexual Activity    Alcohol use: No    Drug use: No    Sexual activity: Defer       Family History:  Family History   Problem Relation Age of Onset    No Known Problems Mother     Cancer Father         bladder    Arthritis Son     Breast cancer Maternal Aunt         40's or 50's    Ovarian cancer Neg Hx            Vital Signs:   /84   Pulse 83   Temp 99.1 °F (37.3 °C) (Temporal)   Ht 149.9 cm (59.02\")   Wt 105 kg (230 lb 12.8 oz)   SpO2 98%   BMI 46.59 kg/m²      Physical Exam:  Physical Exam  Constitutional:       Appearance: She is not ill-appearing.   HENT:      Right Ear: A middle ear effusion is present.      Left Ear: A middle ear effusion is present.      Nose: Mucosal edema " present.      Right Turbinates: Swollen.      Left Turbinates: Swollen.   Eyes:      Pupils: Pupils are equal, round, and reactive to light.   Cardiovascular:      Rate and Rhythm: Normal rate.      Pulses: Normal pulses.   Pulmonary:      Effort: Pulmonary effort is normal.      Breath sounds: Wheezing present.   Neurological:      General: No focal deficit present.      Mental Status: She is alert. Mental status is at baseline.       What is most likely a lipoma noted in the left paraspinal area about a centimeter in diameter          PHQ-9 Score  PHQ-9 Total Score: 0     Lab Review  Office Visit on 04/08/2024   Component Date Value Ref Range Status    Rapid Influenza A Ag 04/08/2024 Negative  Negative Final    Rapid Influenza B Ag 04/08/2024 Negative  Negative Final    Internal Control 04/08/2024 Passed  Passed Final    Lot Number 04/08/2024 [id number]   Final    Expiration Date 04/08/2024 12/08/2025   Final    Rapid Strep A Screen 04/08/2024 Negative  Negative, VALID, INVALID, Not Performed Final    Internal Control 04/08/2024 Passed  Passed Final    Lot Number 04/08/2024 [id number]   Final    Expiration Date 04/08/2024 10/02/2026   Final    SARS Antigen 04/08/2024 Not Detected  Not Detected, Presumptive Negative Final    Internal Control 04/08/2024 Passed  Passed Final    Lot Number 04/08/2024 [id number]   Final    Expiration Date 04/08/2024 07/03/2024   Final        Radiology Results        Assessment / Plan         Assessment and Plan   Diagnoses and all orders for this visit:    1. Bronchitis (Primary)  Assessment & Plan:  Postviral bronchitis with likely increased symptoms due to reactive airway.  Starting patient on a daily inhaler of fluticasone and albuterol as needed.  She has taken fluticasone inhalers in the past and this helped her symptoms.  For the acute symptoms I will prescribe Zithromax and Medrol Dosepak.    Orders:  -     azithromycin (Zithromax Z-Jac) 250 MG tablet; Take 2 tablets by mouth on  day 1, then 1 tablet daily on days 2-5  Dispense: 6 tablet; Refill: 0  -     methylPREDNISolone (MEDROL) 4 MG dose pack; Take as directed on package instructions.  Dispense: 21 tablet; Refill: 0    2. Sore throat  -     POC Influenza A / B  -     POC Rapid Strep A  -     POCT SARS-CoV-2 Antigen    3. Mild intermittent reactive airway disease with acute exacerbation  -     fluticasone (Flovent HFA) 110 MCG/ACT inhaler; Inhale 1 puff 2 (Two) Times a Day.  Dispense: 12 g; Refill: 11  -     albuterol sulfate  (90 Base) MCG/ACT inhaler; Inhale 2 puffs Every 4 (Four) Hours As Needed for Wheezing.  Dispense: 8 g; Refill: 5    4. Lipoma of other specified sites  Assessment & Plan:  Ultrasound of area of concern ordered    Orders:  -     US Head Neck Soft Tissue; Future        Discussed possible differential diagnoses, testing, treatment, recommended non-pharmacological interventions, risks, warning signs to monitor for that would indicate need for follow-up in clinic or ER. If no improvement with these regimens or you have new or worsening symptoms follow-up. Patient verbalizes understanding and agreement with plan of care. Denies further needs or concerns.     Patient was given instructions and counseling regarding her condition and for health maintenance advised.              Health Maintenance  Health Maintenance:   Health Maintenance Due   Topic Date Due    Pneumococcal Vaccine 0-64 (1 of 2 - PCV) Never done    PAP SMEAR  07/11/2019    COVID-19 Vaccine (3 - 2023-24 season) 09/01/2023        Meds ordered during this visit  New Medications Ordered This Visit   Medications    azithromycin (Zithromax Z-Jac) 250 MG tablet     Sig: Take 2 tablets by mouth on day 1, then 1 tablet daily on days 2-5     Dispense:  6 tablet     Refill:  0    methylPREDNISolone (MEDROL) 4 MG dose pack     Sig: Take as directed on package instructions.     Dispense:  21 tablet     Refill:  0    fluticasone (Flovent HFA) 110 MCG/ACT inhaler      Sig: Inhale 1 puff 2 (Two) Times a Day.     Dispense:  12 g     Refill:  11    albuterol sulfate  (90 Base) MCG/ACT inhaler     Sig: Inhale 2 puffs Every 4 (Four) Hours As Needed for Wheezing.     Dispense:  8 g     Refill:  5       Meds stopped during this visit:  Medications Discontinued During This Encounter   Medication Reason    methylPREDNISolone (MEDROL) 4 MG dose pack Reorder    azithromycin (Zithromax Z-Jac) 250 MG tablet Reorder    montelukast (Singulair) 10 MG tablet Side effects        Visit Diagnoses    ICD-10-CM ICD-9-CM   1. Bronchitis  J40 490   2. Sore throat  J02.9 462   3. Mild intermittent reactive airway disease with acute exacerbation  J45.21 493.92   4. Lipoma of other specified sites  D17.79 214.8       Patient was given instructions and counseling regarding her condition or for health maintenance advice. Please see specific information pulled into the AVS if appropriate.     Follow Up   Return for Annual.      This document has been electronically signed by Christi Pittman DO   April 8, 2024 09:47 EDT    Dictated Utilizing Dragon Dictation: Part of this note may be an electronic transcription/translation of spoken language to printed text using the Dragon Dictation System.    Christi Pittman D.O.  [facility] [place]

## 2024-07-31 ENCOUNTER — PATIENT ROUNDING (BHMG ONLY) (OUTPATIENT)
Dept: URGENT CARE | Facility: CLINIC | Age: 44
End: 2024-07-31

## 2024-07-31 NOTE — ED NOTES

## 2024-09-12 ENCOUNTER — PATIENT MESSAGE (OUTPATIENT)
Dept: FAMILY MEDICINE CLINIC | Facility: CLINIC | Age: 44
End: 2024-09-12
Payer: COMMERCIAL

## 2024-09-20 ENCOUNTER — TELEPHONE (OUTPATIENT)
Dept: FAMILY MEDICINE CLINIC | Facility: CLINIC | Age: 44
End: 2024-09-20
Payer: COMMERCIAL

## 2024-09-20 DIAGNOSIS — N64.4 BREAST PAIN IN FEMALE: ICD-10-CM

## 2024-09-20 DIAGNOSIS — Z12.39 SCREENING BREAST EXAMINATION: Primary | ICD-10-CM

## 2024-09-23 LAB
NCCN CRITERIA FLAG: NORMAL
TYRER CUZICK SCORE: 16.4

## 2024-09-27 ENCOUNTER — OFFICE VISIT (OUTPATIENT)
Dept: FAMILY MEDICINE CLINIC | Facility: CLINIC | Age: 44
End: 2024-09-27
Payer: COMMERCIAL

## 2024-09-27 VITALS
SYSTOLIC BLOOD PRESSURE: 108 MMHG | HEIGHT: 59 IN | WEIGHT: 222 LBS | HEART RATE: 80 BPM | DIASTOLIC BLOOD PRESSURE: 76 MMHG | OXYGEN SATURATION: 95 % | RESPIRATION RATE: 21 BRPM | TEMPERATURE: 97.7 F | BODY MASS INDEX: 44.76 KG/M2

## 2024-09-27 DIAGNOSIS — H65.191 ACUTE NON-SUPPURATIVE OTITIS MEDIA, RIGHT: ICD-10-CM

## 2024-09-27 DIAGNOSIS — R09.81 NASAL CONGESTION: Primary | ICD-10-CM

## 2024-09-27 PROCEDURE — 87426 SARSCOV CORONAVIRUS AG IA: CPT | Performed by: PHYSICIAN ASSISTANT

## 2024-09-27 PROCEDURE — 99213 OFFICE O/P EST LOW 20 MIN: CPT | Performed by: PHYSICIAN ASSISTANT

## 2024-09-27 RX ORDER — FLUTICASONE PROPIONATE 50 UG/1
2 SPRAY, METERED NASAL DAILY
Qty: 15.8 ML | Refills: 1 | Status: SHIPPED | OUTPATIENT
Start: 2024-09-27 | End: 2024-10-07

## 2024-09-30 ENCOUNTER — HOSPITAL ENCOUNTER (OUTPATIENT)
Dept: MAMMOGRAPHY | Facility: HOSPITAL | Age: 44
Discharge: HOME | End: 2024-09-30
Admitting: FAMILY MEDICINE
Payer: COMMERCIAL

## 2024-09-30 DIAGNOSIS — N64.4 BREAST PAIN IN FEMALE: ICD-10-CM

## 2024-09-30 DIAGNOSIS — Z12.39 SCREENING BREAST EXAMINATION: ICD-10-CM

## 2024-09-30 PROCEDURE — 77066 DX MAMMO INCL CAD BI: CPT | Performed by: RADIOLOGY

## 2024-09-30 PROCEDURE — 77066 DX MAMMO INCL CAD BI: CPT

## 2024-09-30 PROCEDURE — G0279 TOMOSYNTHESIS, MAMMO: HCPCS

## 2024-09-30 PROCEDURE — 77062 BREAST TOMOSYNTHESIS BI: CPT | Performed by: RADIOLOGY

## 2024-10-14 ENCOUNTER — TELEPHONE (OUTPATIENT)
Dept: FAMILY MEDICINE CLINIC | Facility: CLINIC | Age: 44
End: 2024-10-14
Payer: COMMERCIAL

## 2024-10-14 NOTE — TELEPHONE ENCOUNTER
Hub and front can relay    Tried to reach the pt in regards to an old order her her chart for a US Abdomen Limited . If you have completed this please send us those results.  If you have not completed this scan please call our scheduling office at [phone] and make that appointment.

## 2024-10-24 ENCOUNTER — LAB (OUTPATIENT)
Dept: LAB | Facility: HOSPITAL | Age: 44
End: 2024-10-24
Payer: COMMERCIAL

## 2024-10-24 ENCOUNTER — OFFICE VISIT (OUTPATIENT)
Dept: FAMILY MEDICINE CLINIC | Facility: CLINIC | Age: 44
End: 2024-10-24
Payer: COMMERCIAL

## 2024-10-24 VITALS
WEIGHT: 223.6 LBS | BODY MASS INDEX: 45.08 KG/M2 | SYSTOLIC BLOOD PRESSURE: 100 MMHG | HEIGHT: 59 IN | HEART RATE: 70 BPM | DIASTOLIC BLOOD PRESSURE: 60 MMHG | TEMPERATURE: 98.6 F | OXYGEN SATURATION: 97 % | RESPIRATION RATE: 20 BRPM

## 2024-10-24 DIAGNOSIS — J45.20 MILD INTERMITTENT REACTIVE AIRWAY DISEASE WITHOUT COMPLICATION: ICD-10-CM

## 2024-10-24 DIAGNOSIS — R06.2 WHEEZING: ICD-10-CM

## 2024-10-24 DIAGNOSIS — Z00.00 ENCOUNTER FOR ANNUAL PHYSICAL EXAM: ICD-10-CM

## 2024-10-24 DIAGNOSIS — E55.9 VITAMIN D DEFICIENCY: ICD-10-CM

## 2024-10-24 DIAGNOSIS — N95.1 PERIMENOPAUSAL SYMPTOMS: ICD-10-CM

## 2024-10-24 DIAGNOSIS — E53.8 VITAMIN B12 DEFICIENCY: ICD-10-CM

## 2024-10-24 DIAGNOSIS — Z00.00 ENCOUNTER FOR ANNUAL PHYSICAL EXAM: Primary | ICD-10-CM

## 2024-10-24 DIAGNOSIS — Z12.4 SCREENING FOR CERVICAL CANCER: ICD-10-CM

## 2024-10-24 PROBLEM — J45.909 REACTIVE AIRWAY DISEASE: Status: ACTIVE | Noted: 2024-10-24

## 2024-10-24 LAB
25(OH)D3 SERPL-MCNC: 24.6 NG/ML (ref 30–100)
ALBUMIN SERPL-MCNC: 3.7 G/DL (ref 3.5–5.2)
ALBUMIN/GLOB SERPL: 1.3 G/DL
ALP SERPL-CCNC: 59 U/L (ref 39–117)
ALT SERPL W P-5'-P-CCNC: 26 U/L (ref 1–33)
ANION GAP SERPL CALCULATED.3IONS-SCNC: 9.3 MMOL/L (ref 5–15)
AST SERPL-CCNC: 24 U/L (ref 1–32)
BASOPHILS # BLD AUTO: 0.04 10*3/MM3 (ref 0–0.2)
BASOPHILS NFR BLD AUTO: 0.7 % (ref 0–1.5)
BILIRUB SERPL-MCNC: 0.3 MG/DL (ref 0–1.2)
BUN SERPL-MCNC: 10 MG/DL (ref 6–20)
BUN/CREAT SERPL: 13.7 (ref 7–25)
CALCIUM SPEC-SCNC: 9.1 MG/DL (ref 8.6–10.5)
CHLORIDE SERPL-SCNC: 104 MMOL/L (ref 98–107)
CHOLEST SERPL-MCNC: 253 MG/DL (ref 0–200)
CO2 SERPL-SCNC: 24.7 MMOL/L (ref 22–29)
CREAT SERPL-MCNC: 0.73 MG/DL (ref 0.57–1)
DEPRECATED RDW RBC AUTO: 43.3 FL (ref 37–54)
EGFRCR SERPLBLD CKD-EPI 2021: 104.1 ML/MIN/1.73
EOSINOPHIL # BLD AUTO: 0.19 10*3/MM3 (ref 0–0.4)
EOSINOPHIL NFR BLD AUTO: 3.3 % (ref 0.3–6.2)
ERYTHROCYTE [DISTWIDTH] IN BLOOD BY AUTOMATED COUNT: 13.2 % (ref 12.3–15.4)
FSH SERPL-ACNC: 5.3 MIU/ML
GLOBULIN UR ELPH-MCNC: 2.9 GM/DL
GLUCOSE SERPL-MCNC: 88 MG/DL (ref 65–99)
HBA1C MFR BLD: 4.7 % (ref 4.8–5.6)
HCT VFR BLD AUTO: 42.6 % (ref 34–46.6)
HDLC SERPL-MCNC: 49 MG/DL (ref 40–60)
HGB BLD-MCNC: 14.5 G/DL (ref 12–15.9)
IMM GRANULOCYTES # BLD AUTO: 0.01 10*3/MM3 (ref 0–0.05)
IMM GRANULOCYTES NFR BLD AUTO: 0.2 % (ref 0–0.5)
LDLC SERPL CALC-MCNC: 189 MG/DL (ref 0–100)
LDLC/HDLC SERPL: 3.8 {RATIO}
LH SERPL-ACNC: 16.2 MIU/ML
LYMPHOCYTES # BLD AUTO: 1.91 10*3/MM3 (ref 0.7–3.1)
LYMPHOCYTES NFR BLD AUTO: 32.9 % (ref 19.6–45.3)
MCH RBC QN AUTO: 30.9 PG (ref 26.6–33)
MCHC RBC AUTO-ENTMCNC: 34 G/DL (ref 31.5–35.7)
MCV RBC AUTO: 90.8 FL (ref 79–97)
MONOCYTES # BLD AUTO: 0.68 10*3/MM3 (ref 0.1–0.9)
MONOCYTES NFR BLD AUTO: 11.7 % (ref 5–12)
NEUTROPHILS NFR BLD AUTO: 2.98 10*3/MM3 (ref 1.7–7)
NEUTROPHILS NFR BLD AUTO: 51.2 % (ref 42.7–76)
NRBC BLD AUTO-RTO: 0 /100 WBC (ref 0–0.2)
PLATELET # BLD AUTO: 326 10*3/MM3 (ref 140–450)
PMV BLD AUTO: 11.3 FL (ref 6–12)
POTASSIUM SERPL-SCNC: 4.2 MMOL/L (ref 3.5–5.2)
PROLACTIN SERPL-MCNC: 13.7 NG/ML (ref 4.79–23.3)
PROT SERPL-MCNC: 6.6 G/DL (ref 6–8.5)
RBC # BLD AUTO: 4.69 10*6/MM3 (ref 3.77–5.28)
SODIUM SERPL-SCNC: 138 MMOL/L (ref 136–145)
TRIGL SERPL-MCNC: 89 MG/DL (ref 0–150)
TSH SERPL DL<=0.05 MIU/L-ACNC: 1.25 UIU/ML (ref 0.27–4.2)
VIT B12 BLD-MCNC: 408 PG/ML (ref 211–946)
VLDLC SERPL-MCNC: 15 MG/DL (ref 5–40)
WBC NRBC COR # BLD AUTO: 5.81 10*3/MM3 (ref 3.4–10.8)

## 2024-10-24 PROCEDURE — 84403 ASSAY OF TOTAL TESTOSTERONE: CPT

## 2024-10-24 PROCEDURE — 83036 HEMOGLOBIN GLYCOSYLATED A1C: CPT

## 2024-10-24 PROCEDURE — 82306 VITAMIN D 25 HYDROXY: CPT

## 2024-10-24 PROCEDURE — 84402 ASSAY OF FREE TESTOSTERONE: CPT

## 2024-10-24 PROCEDURE — 36415 COLL VENOUS BLD VENIPUNCTURE: CPT

## 2024-10-24 PROCEDURE — 82672 ASSAY OF ESTROGEN: CPT

## 2024-10-24 PROCEDURE — 84146 ASSAY OF PROLACTIN: CPT

## 2024-10-24 PROCEDURE — 80050 GENERAL HEALTH PANEL: CPT

## 2024-10-24 PROCEDURE — 82607 VITAMIN B-12: CPT

## 2024-10-24 PROCEDURE — 83002 ASSAY OF GONADOTROPIN (LH): CPT

## 2024-10-24 PROCEDURE — 80061 LIPID PANEL: CPT

## 2024-10-24 PROCEDURE — 82626 DEHYDROEPIANDROSTERONE: CPT

## 2024-10-24 PROCEDURE — 83001 ASSAY OF GONADOTROPIN (FSH): CPT

## 2024-10-24 PROCEDURE — 99396 PREV VISIT EST AGE 40-64: CPT | Performed by: FAMILY MEDICINE

## 2024-10-24 RX ORDER — ALBUTEROL SULFATE 90 UG/1
2 INHALANT RESPIRATORY (INHALATION) EVERY 4 HOURS PRN
Qty: 6.7 G | Refills: 5 | Status: SHIPPED | OUTPATIENT
Start: 2024-10-24

## 2024-10-24 NOTE — ASSESSMENT & PLAN NOTE
Annual wellness exam completed today. Health Maintenance including immunizations was updated and reflected in the chart. Yearly screening labs were ordered.     Further recommendations to be given once lab data received.     Health advice: healthy food choices with fresh fruits and vegetables, maintain sleep pattern at least 8 hours, avoid texting and distracted driving practices; wear safety belt, engage in regular exercise, maintain healthy weight, use safe sex practices, avoid alcohol and illicit drugs. Maintain immunizations that are up to date. Maintain health maintenance:Mammo, PAP, etc.  Follow up with PCP if struggling with depression or anxiety. Keep regular dental and eye exams. Brush and floss teeth daily.     I suggest they take a daily multivitamin that is age-appropriate (example women's One-A-Day.)  Patient voiced understanding of these instructions.     Follow up Annually for Physical

## 2024-10-24 NOTE — ASSESSMENT & PLAN NOTE
PFTs previously not indicative of asthma however, she meets criteria for reactive airway. Will continue PRN albuterol.

## 2024-10-24 NOTE — PROGRESS NOTES
Subjective     Chief Complaint  Annual Exam    Subjective          Eun Majano is a 44 y.o. female who presents today to [facility] for  annual physical .    HPI:   History of Present Illness    This patient is a very pleasant 43-year-old female who presents today to have a annual physical exam    Her past medical history includes history of abnormal Pap smear, TMJ dislocation, uterine fibroid, vitamin B12 deficiency, Vitamin D def,     She does not have an asthma diagnosis but gets frequent wheezing, she has had PFTs but this has been a few years ago. She will get a cough at times with a trigger and this resolves with albuterol.     She has had some possible perimenopausal symptoms. She has had some increased brain fog and irritability etc especially around her menstrual cycle. She notes that her mother had a hysterectomy around her age. Her cycle hasn't necessarily changed. She have increased cramping lately, she has a history of uterine fibroid.     The following portions of the patient's history were reviewed and updated as appropriate: allergies, current medications, past family history, past medical history, past social history, past surgical history and problem list.    Objective     Objective     Allergy:   No Known Allergies     Current Medications:   Current Outpatient Medications   Medication Sig Dispense Refill   • albuterol sulfate  (90 Base) MCG/ACT inhaler Inhale 2 puffs Every 4 (Four) Hours As Needed for Wheezing. 6.7 g 5   • Cetirizine HCl 10 MG capsule Take  by mouth.     • cyanocobalamin (VITAMIN B-12) 2500 MCG tablet tablet Take 1 tablet by mouth Daily. 30 tablet 5   • fluticasone (FLONASE) 50 MCG/ACT nasal spray Administer 2 sprays into the nostril(s) as directed by provider Daily for 10 days. 15.8 mL 1     No current facility-administered medications for this visit.       Past Medical History:  Past Medical History:   Diagnosis Date  "  • Abnormal Pap smear of cervix     HPV   • Fibroid     RESOLVED LAST PREGNANCY   • Recurrent pregnancy loss, antepartum condition or complication     X 3   • TMJ (dislocation of temporomandibular joint)        Past Surgical History:  Past Surgical History:   Procedure Laterality Date   • HEMORRHOIDECTOMY     • TONSILLECTOMY     • WISDOM TOOTH EXTRACTION         Social History:  Social History     Socioeconomic History   • Marital status:    Tobacco Use   • Smoking status: Never     Passive exposure: Never   • Smokeless tobacco: Never   Vaping Use   • Vaping status: Never Used   • Passive vaping exposure: Yes   Substance and Sexual Activity   • Alcohol use: No   • Drug use: No   • Sexual activity: Defer       Family History:  Family History   Problem Relation Age of Onset   • No Known Problems Mother    • Cancer Father         bladder   • Arthritis Son    • Breast cancer Maternal Aunt         40's or 50's   • Ovarian cancer Neg Hx    • Endometrial cancer Neg Hx          Vital Signs:   /60   Pulse 70   Temp 98.6 °F (37 °C) (Temporal)   Resp 20   Ht 149.9 cm (59.02\")   Wt 101 kg (223 lb 9.6 oz)   SpO2 97%   BMI 45.14 kg/m²      Physical Exam:  Physical Exam  Vitals reviewed.   Constitutional:       Appearance: She is not ill-appearing or diaphoretic.   HENT:      Head: Normocephalic.   Eyes:      Conjunctiva/sclera: Conjunctivae normal.      Pupils: Pupils are equal, round, and reactive to light.   Neck:      Thyroid: No thyromegaly.   Cardiovascular:      Rate and Rhythm: Normal rate.      Pulses: Normal pulses.      Heart sounds: No murmur heard.  Pulmonary:      Effort: Pulmonary effort is normal.      Breath sounds: Normal breath sounds. No wheezing.   Musculoskeletal:      Right lower leg: No edema.      Left lower leg: No edema.   Skin:     Capillary Refill: Capillary refill takes less than 2 seconds.   Neurological:      General: No focal deficit present.      Mental Status: She is alert " and oriented to person, place, and time. Mental status is at baseline.      Gait: Gait normal.   Psychiatric:         Mood and Affect: Mood normal.         Behavior: Behavior normal.         Thought Content: Thought content normal.         Judgment: Judgment normal.               PHQ-9 Score  PHQ-9 Total Score:       Lab Review  Office Visit on 09/27/2024   Component Date Value Ref Range Status   • SARS Antigen 09/27/2024 Not Detected  Not Detected, Presumptive Negative Final   • Internal Control 09/27/2024 Passed  Passed Final   • Lot Number 09/27/2024 [id number]   Final   • Expiration Date 09/27/2024 04/28/2025   Final   Orders Only on 09/23/2024   Component Date Value Ref Range Status   • TyrerCuzick 09/23/2024 16.4   Final   • NCCN 09/23/2024 NCCN not met   Final    High Risk Cancer Risk Assessment        Radiology Results  Mammo Diagnostic Digital Tomosynthesis Bilateral With CAD    Result Date: 9/30/2024  Impression: Benign bilateral mammogram.  RECOMMENDATION: 1. Annual screening mammography. 2. Clinical follow-up of cyclic right breast pain. The patient was asymptomatic at the time of mammographic imaging.  BI-RADS CATEGORY:  2, BENIGN.  CAD was utilized.  The standard false-negative rate of mammography is between 10% and 25%. Complex patterns or increased breast density will markedly elevate the false-negative rate of mammography.   A letter, in lay terminology, with the results of this exam was given to the patient at the time of the visit.  This report was finalized on 9/30/2024 2:23 PM by Dr. Charleen Holguin MD.        Assessment / Plan         Assessment and Plan   Diagnoses and all orders for this visit:    1. Encounter for annual physical exam (Primary)  Assessment & Plan:  Annual wellness exam completed today. Health Maintenance including immunizations was updated and reflected in the chart. Yearly screening labs were ordered.     Further recommendations to be given once lab data received.     Health  advice: healthy food choices with fresh fruits and vegetables, maintain sleep pattern at least 8 hours, avoid texting and distracted driving practices; wear safety belt, engage in regular exercise, maintain healthy weight, use safe sex practices, avoid alcohol and illicit drugs. Maintain immunizations that are up to date. Maintain health maintenance:Mammo, PAP, etc.  Follow up with PCP if struggling with depression or anxiety. Keep regular dental and eye exams. Brush and floss teeth daily.     I suggest they take a daily multivitamin that is age-appropriate (example women's One-A-Day.)  Patient voiced understanding of these instructions.     Follow up Annually for Physical       Orders:  -     CBC & Differential; Future  -     Comprehensive Metabolic Panel; Future  -     Hemoglobin A1c; Future  -     Lipid Panel; Future  -     TSH Rfx On Abnormal To Free T4; Future  -     Vitamin B12; Future  -     Vitamin D,25-Hydroxy; Future    2. Mild intermittent reactive airway disease without complication  Assessment & Plan:  PFTs previously not indicative of asthma however, she meets criteria for reactive airway. Will continue PRN albuterol.     Orders:  -     albuterol sulfate  (90 Base) MCG/ACT inhaler; Inhale 2 puffs Every 4 (Four) Hours As Needed for Wheezing.  Dispense: 6.7 g; Refill: 5    3. Wheezing  -     albuterol sulfate  (90 Base) MCG/ACT inhaler; Inhale 2 puffs Every 4 (Four) Hours As Needed for Wheezing.  Dispense: 6.7 g; Refill: 5    4. Vitamin D deficiency  -     Vitamin D,25-Hydroxy; Future    5. Vitamin B12 deficiency  -     Vitamin B12; Future    6. Screening for cervical cancer  -     Ambulatory Referral to Gynecology    7. Perimenopausal symptoms  -     FSH & LH; Future  -     Estrogens, Total; Future  -     DHEA; Future  -     Prolactin; Future  -     Testosterone, Free, Total; Future          Discussed possible differential diagnoses, testing, treatment, recommended non-pharmacological  interventions, risks, warning signs to monitor for that would indicate need for follow-up in clinic or ER. If no improvement with these regimens or you have new or worsening symptoms follow-up. Patient verbalizes understanding and agreement with plan of care. Denies further needs or concerns.     Patient was given instructions and counseling regarding her condition and for health maintenance advised.      Health Maintenance  Health Maintenance:   Health Maintenance Due   Topic Date Due   • Pneumococcal Vaccine 0-64 (1 of 2 - PCV) Never done   • PAP SMEAR  07/11/2019   • LIPID PANEL  06/16/2024        Meds ordered during this visit  New Medications Ordered This Visit   Medications   • albuterol sulfate  (90 Base) MCG/ACT inhaler     Sig: Inhale 2 puffs Every 4 (Four) Hours As Needed for Wheezing.     Dispense:  6.7 g     Refill:  5       Meds stopped during this visit:  Medications Discontinued During This Encounter   Medication Reason   • albuterol sulfate  (90 Base) MCG/ACT inhaler Reorder          Visit Diagnoses    ICD-10-CM ICD-9-CM   1. Encounter for annual physical exam  Z00.00 V70.0   2. Mild intermittent reactive airway disease without complication  J45.20 493.90   3. Wheezing  R06.2 786.07   4. Vitamin D deficiency  E55.9 268.9   5. Vitamin B12 deficiency  E53.8 266.2   6. Screening for cervical cancer  Z12.4 V76.2   7. Perimenopausal symptoms  N95.1 627.2         Patient was given instructions and counseling regarding her condition or for health maintenance advice. Please see specific information pulled into the AVS if appropriate.     Follow Up   Return in about 1 year (around 10/24/2025) for Annual.      This document has been electronically signed by Christi Pittman DO   October 24, 2024 08:32 EDT    Dictated Utilizing Dragon Dictation: Part of this note may be an electronic transcription/translation of spoken language to printed text using the Dragon Dictation System.    Christi Pittman  PRASHANT  [facility] [place] [facility] [place]

## 2024-10-27 LAB — ESTROGEN SERPL-MCNC: 567 PG/ML

## 2024-10-28 LAB
TESTOST FREE SERPL-MCNC: 1.2 PG/ML (ref 0–4.2)
TESTOST SERPL-MCNC: 32 NG/DL (ref 4–50)

## 2024-10-31 LAB — DHEA SERPL-MCNC: 124 NG/DL (ref 31–701)

## 2025-01-02 DIAGNOSIS — E66.01 OBESITY, CLASS III, BMI 40-49.9 (MORBID OBESITY): Primary | ICD-10-CM

## 2025-01-02 RX ORDER — SEMAGLUTIDE 0.25 MG/.5ML
0.25 INJECTION, SOLUTION SUBCUTANEOUS WEEKLY
Qty: 2 ML | Refills: 0 | Status: SHIPPED | OUTPATIENT
Start: 2025-01-02 | End: 2025-01-30

## 2025-01-30 DIAGNOSIS — E66.01 OBESITY, CLASS III, BMI 40-49.9 (MORBID OBESITY): Primary | ICD-10-CM

## 2025-02-28 ENCOUNTER — PRIOR AUTHORIZATION (OUTPATIENT)
Dept: FAMILY MEDICINE CLINIC | Facility: CLINIC | Age: 45
End: 2025-02-28

## 2025-02-28 ENCOUNTER — OFFICE VISIT (OUTPATIENT)
Dept: FAMILY MEDICINE CLINIC | Facility: CLINIC | Age: 45
End: 2025-02-28
Payer: COMMERCIAL

## 2025-02-28 VITALS
HEART RATE: 77 BPM | OXYGEN SATURATION: 97 % | WEIGHT: 220.8 LBS | SYSTOLIC BLOOD PRESSURE: 100 MMHG | RESPIRATION RATE: 20 BRPM | DIASTOLIC BLOOD PRESSURE: 60 MMHG | HEIGHT: 59 IN | BODY MASS INDEX: 44.51 KG/M2 | TEMPERATURE: 98.7 F

## 2025-02-28 DIAGNOSIS — E66.01 OBESITY, CLASS III, BMI 40-49.9 (MORBID OBESITY): Primary | ICD-10-CM

## 2025-02-28 NOTE — PROGRESS NOTES
Subjective     Chief Complaint  No chief complaint on file.    Subjective          Eun Majano is a 45 y.o. female who presents today to [facility] for follow up.    HPI:   History of Present Illness    History of Present Illness  The patient is a 45-year-old female who presents today to follow up on chronic issues.    She has been on compounded semaglutide, with the dosage having been increased last month. She reports tolerating the medication well, with the only side effect being constipation. She has been experiencing intermittent constipation, which she attributes to the medication. To alleviate this, she has incorporated mushroom coffee into her diet. Over the past 5 weeks, she has lost 6 pounds. Initially, she did not observe any changes in her appetite, but following the dosage increase, she has noticed a suppression in her appetite. She administers the medication every Friday evening.    She reports no respiratory issues or breathing difficulties.  This morning, she experienced sinus pressure in her ears and eyes, accompanied by a headache. She occasionally uses her inhaler and took Zyrtec Allergy this morning. She has a history of ear infections and has had tubes inserted in her ears.    MEDICATIONS  Current: semaglutide, B12, Zyrtec      The following portions of the patient's history were reviewed and updated as appropriate: allergies, current medications, past family history, past medical history, past social history, past surgical history and problem list.    Objective     Objective     Allergy:   No Known Allergies     Current Medications:   Current Outpatient Medications   Medication Sig Dispense Refill    albuterol sulfate  (90 Base) MCG/ACT inhaler Inhale 2 puffs Every 4 (Four) Hours As Needed for Wheezing. 6.7 g 5    Cetirizine HCl 10 MG capsule Take  by mouth.      cyanocobalamin (VITAMIN B-12) 2500 MCG tablet tablet Take 1 tablet by  "mouth Daily. 30 tablet 5    fluticasone (FLONASE) 50 MCG/ACT nasal spray Administer 2 sprays into the nostril(s) as directed by provider Daily for 10 days. 15.8 mL 1    Semaglutide-Weight Management 1 MG/0.5ML solution auto-injector Inject 0.5 mL under the skin into the appropriate area as directed 1 (One) Time Per Week. 2 mL 0     No current facility-administered medications for this visit.       Past Medical History:  Past Medical History:   Diagnosis Date    Abnormal Pap smear of cervix     HPV    Fibroid     RESOLVED LAST PREGNANCY    Recurrent pregnancy loss, antepartum condition or complication     X 3    TMJ (dislocation of temporomandibular joint)        Past Surgical History:  Past Surgical History:   Procedure Laterality Date    HEMORRHOIDECTOMY      TONSILLECTOMY      WISDOM TOOTH EXTRACTION         Social History:  Social History     Socioeconomic History    Marital status:    Tobacco Use    Smoking status: Never     Passive exposure: Never    Smokeless tobacco: Never   Vaping Use    Vaping status: Never Used    Passive vaping exposure: Yes   Substance and Sexual Activity    Alcohol use: No    Drug use: No    Sexual activity: Defer       Family History:  Family History   Problem Relation Age of Onset    No Known Problems Mother     Cancer Father         bladder    Arthritis Son     Breast cancer Maternal Aunt         40's or 50's    Ovarian cancer Neg Hx     Endometrial cancer Neg Hx          Vital Signs:   /60 (BP Location: Right arm, Patient Position: Sitting, Cuff Size: Adult)   Pulse 77   Temp 98.7 °F (37.1 °C) (Temporal)   Resp 20   Ht 149.9 cm (59.02\")   Wt 100 kg (220 lb 12.8 oz)   SpO2 97%   BMI 44.57 kg/m²      Physical Exam:  Physical Exam  Vitals reviewed.   Constitutional:       Appearance: She is not ill-appearing.   HENT:      Right Ear: Tympanic membrane normal.      Left Ear: Tympanic membrane normal.   Eyes:      Pupils: Pupils are equal, round, and reactive to light. "   Cardiovascular:      Rate and Rhythm: Normal rate.      Pulses: Normal pulses.   Pulmonary:      Effort: Pulmonary effort is normal.      Breath sounds: Normal breath sounds.   Neurological:      General: No focal deficit present.      Mental Status: She is alert. Mental status is at baseline.   Psychiatric:         Mood and Affect: Mood normal.         Behavior: Behavior normal.         Thought Content: Thought content normal.         Judgment: Judgment normal.               PHQ-9 Score  PHQ-9 Total Score:      Lab Review  No visits with results within 3 Month(s) from this visit.   Latest known visit with results is:   Lab on 10/24/2024   Component Date Value Ref Range Status    Glucose 10/24/2024 88  65 - 99 mg/dL Final    BUN 10/24/2024 10  6 - 20 mg/dL Final    Creatinine 10/24/2024 0.73  0.57 - 1.00 mg/dL Final    Sodium 10/24/2024 138  136 - 145 mmol/L Final    Potassium 10/24/2024 4.2  3.5 - 5.2 mmol/L Final    Chloride 10/24/2024 104  98 - 107 mmol/L Final    CO2 10/24/2024 24.7  22.0 - 29.0 mmol/L Final    Calcium 10/24/2024 9.1  8.6 - 10.5 mg/dL Final    Total Protein 10/24/2024 6.6  6.0 - 8.5 g/dL Final    Albumin 10/24/2024 3.7  3.5 - 5.2 g/dL Final    ALT (SGPT) 10/24/2024 26  1 - 33 U/L Final    AST (SGOT) 10/24/2024 24  1 - 32 U/L Final    Alkaline Phosphatase 10/24/2024 59  39 - 117 U/L Final    Total Bilirubin 10/24/2024 0.3  0.0 - 1.2 mg/dL Final    Globulin 10/24/2024 2.9  gm/dL Final    A/G Ratio 10/24/2024 1.3  g/dL Final    BUN/Creatinine Ratio 10/24/2024 13.7  7.0 - 25.0 Final    Anion Gap 10/24/2024 9.3  5.0 - 15.0 mmol/L Final    eGFR 10/24/2024 104.1  >60.0 mL/min/1.73 Final    Hemoglobin A1C 10/24/2024 4.70 (L)  4.80 - 5.60 % Final    Total Cholesterol 10/24/2024 253 (H)  0 - 200 mg/dL Final    Triglycerides 10/24/2024 89  0 - 150 mg/dL Final    HDL Cholesterol 10/24/2024 49  40 - 60 mg/dL Final    LDL Cholesterol  10/24/2024 189 (H)  0 - 100 mg/dL Final    VLDL Cholesterol 10/24/2024 15   5 - 40 mg/dL Final    LDL/HDL Ratio 10/24/2024 3.80   Final    TSH 10/24/2024 1.250  0.270 - 4.200 uIU/mL Final    Vitamin B-12 10/24/2024 408  211 - 946 pg/mL Final    25 Hydroxy, Vitamin D 10/24/2024 24.6 (L)  30.0 - 100.0 ng/ml Final    FSH 10/24/2024 5.30  mIU/mL Final    LH 10/24/2024 16.20  mIU/mL Final    Estrogen 10/24/2024 567  pg/mL Final                             Prepubertal             < 40                           Female Cycle:                             1-10 Days         16 - 328                             11-20 Days        34 - 501                             21-30 Days        48 - 350                             Post-Menopausal   40 - 244    DHEA 10/24/2024 124  31 - 701 ng/dL Final    Prolactin 10/24/2024 13.70  4.79 - 23.30 ng/mL Final    Testosterone, Total 10/24/2024 32  4 - 50 ng/dL Final    Testosterone, Free 10/24/2024 1.2  0.0 - 4.2 pg/mL Final    WBC 10/24/2024 5.81  3.40 - 10.80 10*3/mm3 Final    RBC 10/24/2024 4.69  3.77 - 5.28 10*6/mm3 Final    Hemoglobin 10/24/2024 14.5  12.0 - 15.9 g/dL Final    Hematocrit 10/24/2024 42.6  34.0 - 46.6 % Final    MCV 10/24/2024 90.8  79.0 - 97.0 fL Final    MCH 10/24/2024 30.9  26.6 - 33.0 pg Final    MCHC 10/24/2024 34.0  31.5 - 35.7 g/dL Final    RDW 10/24/2024 13.2  12.3 - 15.4 % Final    RDW-SD 10/24/2024 43.3  37.0 - 54.0 fl Final    MPV 10/24/2024 11.3  6.0 - 12.0 fL Final    Platelets 10/24/2024 326  140 - 450 10*3/mm3 Final    Neutrophil % 10/24/2024 51.2  42.7 - 76.0 % Final    Lymphocyte % 10/24/2024 32.9  19.6 - 45.3 % Final    Monocyte % 10/24/2024 11.7  5.0 - 12.0 % Final    Eosinophil % 10/24/2024 3.3  0.3 - 6.2 % Final    Basophil % 10/24/2024 0.7  0.0 - 1.5 % Final    Immature Grans % 10/24/2024 0.2  0.0 - 0.5 % Final    Neutrophils, Absolute 10/24/2024 2.98  1.70 - 7.00 10*3/mm3 Final    Lymphocytes, Absolute 10/24/2024 1.91  0.70 - 3.10 10*3/mm3 Final    Monocytes, Absolute 10/24/2024 0.68  0.10 - 0.90 10*3/mm3 Final     Eosinophils, Absolute 10/24/2024 0.19  0.00 - 0.40 10*3/mm3 Final    Basophils, Absolute 10/24/2024 0.04  0.00 - 0.20 10*3/mm3 Final    Immature Grans, Absolute 10/24/2024 0.01  0.00 - 0.05 10*3/mm3 Final    nRBC 10/24/2024 0.0  0.0 - 0.2 /100 WBC Final        Radiology Results        Assessment / Plan         Assessment and Plan   Diagnoses and all orders for this visit:    1. Obesity, Class III, BMI 40-49.9 (morbid obesity) (Primary)  -     Semaglutide-Weight Management 1 MG/0.5ML solution auto-injector; Inject 0.5 mL under the skin into the appropriate area as directed 1 (One) Time Per Week.  Dispense: 2 mL; Refill: 0        Assessment & Plan  1. Weight management.  She has demonstrated a commendable tolerance to the compounded semaglutide, with a notable weight loss of 6 pounds over a period of 5 weeks. The potential side effects of semaglutide were discussed, including the possibility of increased side effects with higher doses.  She was advised to maintain adequate hydration and consider the use of MiraLAX to manage constipation. She was encouraged to engage in regular physical activity while on the medication to facilitate weight loss and prevent muscle mass loss. She was instructed to communicate via [employer] regarding her preference to either maintain the current dose of semaglutide or increase it to 1.7.    2. Sinus pressure.  Her blood pressure readings were within normal limits at 100/60. She was advised to use a decongestant such as Zyrtec for a few days to alleviate the sinus pressure. In the event of experiencing fullness or a whooshing sensation in her ears, she was recommended to use Flonase or Astelin.      Discussed possible differential diagnoses, testing, treatment, recommended non-pharmacological interventions, risks, warning signs to monitor for that would indicate need for follow-up in clinic or ER. If no improvement with these regimens or you have new or worsening symptoms follow-up. Patient  verbalizes understanding and agreement with plan of care. Denies further needs or concerns.     Patient was given instructions and counseling regarding her condition and for health maintenance advised.         Health Maintenance  Health Maintenance:   Health Maintenance Due   Topic Date Due    PAP SMEAR  07/11/2019    COLORECTAL CANCER SCREENING  02/11/2025        Meds ordered during this visit  New Medications Ordered This Visit   Medications    Semaglutide-Weight Management 1 MG/0.5ML solution auto-injector     Sig: Inject 0.5 mL under the skin into the appropriate area as directed 1 (One) Time Per Week.     Dispense:  2 mL     Refill:  0     Please compound with b12       Meds stopped during this visit:  Medications Discontinued During This Encounter   Medication Reason    Semaglutide-Weight Management 0.5 MG/0.5ML solution auto-injector         Visit Diagnoses    ICD-10-CM ICD-9-CM   1. Obesity, Class III, BMI 40-49.9 (morbid obesity)  E66.01 278.01       Patient was given instructions and counseling regarding her condition or for health maintenance advice. Please see specific information pulled into the AVS if appropriate.     Follow Up   Return in about 3 months (around 5/28/2025) for followup weight loss .      Patient or patient representative verbalized consent for the use of Ambient Listening during the visit with  Christi Pittman DO for chart documentation. 2/28/2025  08:02 EST      This document has been electronically signed by Christi Pittman DO   February 28, 2025 08:15 EST    Dictated Utilizing Dragon Dictation: Part of this note may be an electronic transcription/translation of spoken language to printed text using the Dragon Dictation System.    Christi Pittman D.O.  [facility] [place]

## 2025-03-04 DIAGNOSIS — E66.01 OBESITY, CLASS III, BMI 40-49.9 (MORBID OBESITY): ICD-10-CM

## 2025-03-04 NOTE — TELEPHONE ENCOUNTER
Caller: Eun Majano    Relationship: Self    Best call back number: [phone]     Requested Prescriptions:   Requested Prescriptions     Pending Prescriptions Disp Refills    Semaglutide-Weight Management 1 MG/0.5ML solution auto-injector 2 mL 0     Sig: Inject 0.5 mL under the skin into the appropriate area as directed 1 (One) Time Per Week.        Pharmacy where request should be sent: [facility] [address] [facility] [address] - [phone]  - [phone] FX     Last office visit with prescribing clinician: 2/28/2025   Last telemedicine visit with prescribing clinician: Visit date not found   Next office visit with prescribing clinician: 5/30/2025     PREVIOUS PRESCRIPTION WAS SENT TO THE WRONG PHARMACY, PLEASE CANCEL THAT ONE SENT TO [facility],  NEEDS TO GO TO THIS COMPOUNDING PHARMACY    Does the patient have less than a 3 day supply:  [x] Yes  [] No    Would you like a call back once the refill request has been completed: [] Yes [x] No    If the office needs to give you a call back, can they leave a voicemail: [] Yes [x] No    Rebeka Andrew Rep   03/04/25 16:54 EST

## 2025-04-07 ENCOUNTER — OFFICE VISIT (OUTPATIENT)
Dept: FAMILY MEDICINE CLINIC | Facility: CLINIC | Age: 45
End: 2025-04-07
Payer: COMMERCIAL

## 2025-04-07 ENCOUNTER — TELEPHONE (OUTPATIENT)
Dept: FAMILY MEDICINE CLINIC | Facility: CLINIC | Age: 45
End: 2025-04-07

## 2025-04-07 VITALS
OXYGEN SATURATION: 97 % | DIASTOLIC BLOOD PRESSURE: 64 MMHG | WEIGHT: 214.6 LBS | HEART RATE: 77 BPM | SYSTOLIC BLOOD PRESSURE: 100 MMHG | TEMPERATURE: 98.6 F | BODY MASS INDEX: 43.32 KG/M2

## 2025-04-07 DIAGNOSIS — E66.01 OBESITY, CLASS III, BMI 40-49.9 (MORBID OBESITY): ICD-10-CM

## 2025-04-07 DIAGNOSIS — J01.00 ACUTE NON-RECURRENT MAXILLARY SINUSITIS: Primary | ICD-10-CM

## 2025-04-07 RX ORDER — ETODOLAC 500 MG/1
TABLET, FILM COATED ORAL SEE ADMIN INSTRUCTIONS
COMMUNITY
Start: 2025-03-07

## 2025-04-07 RX ORDER — HYDROCODONE POLISTIREX AND CHLORPHENIRAMINE POLISTIREX 10; 8 MG/5ML; MG/5ML
5 SUSPENSION, EXTENDED RELEASE ORAL EVERY 12 HOURS PRN
Qty: 50 ML | Refills: 0 | Status: SHIPPED | OUTPATIENT
Start: 2025-04-07

## 2025-04-07 NOTE — PROGRESS NOTES
Follow Up Office Visit      Patient Name: Eun Majano  : 1980   MRN: [id number]     Chief Complaint:  Cough, Nasal Congestion (Symptoms started last Saturday 3/29/2025 took at home Covid test neg. Pressure in face with a mild headache. Pressure in ears. Needs med refills.), and Sinusitis     History of Present Illness: Eun Majano is a 45 y.o. female who is here today for acute complaint of nasal congestion.      History of Present Illness  The patient is a 45-year-old female who presents for evaluation of nasal congestion.    She began experiencing symptoms last Saturday, initially attributing them to allergies due to facial tingling. However, her condition progressively worsened, leading to fatigue and the need for rest. Despite not having a fever, she felt significantly unwell, necessitating a day off from work on Monday. She has been producing mucus and her sleep has been disrupted due to persistent coughing. She reports no shortness of breath but mentions occasional wheezing, which is managed with an albuterol inhaler. Additionally, she has been experiencing ear pressure. An at-home COVID-19 test was conducted, yielding a negative result. Her self-management strategies have included Sudafed, Mucinex, and Afrin for the initial 3 days. She has also been using an albuterol inhaler and Flonase intermittently, although without significant relief.    She requests a refill of compound semaglutide to be sent to [facility]. She does not need syringes but requires alcohol pads.        Subjective     Subjective          The following portions of the patient's history were reviewed and updated as appropriate: allergies, current medications, past family history, past medical history, past social history, past surgical history and problem list.    Allergy:   No Known Allergies     Current Medications:   Current Outpatient Medications   Medication Sig Dispense Refill     "albuterol sulfate  (90 Base) MCG/ACT inhaler Inhale 2 puffs Every 4 (Four) Hours As Needed for Wheezing. 6.7 g 5    Cetirizine HCl 10 MG capsule Take  by mouth.      Semaglutide-Weight Management 1 MG/0.5ML solution auto-injector Inject 0.5 mL under the skin into the appropriate area as directed 1 (One) Time Per Week. 2 mL 0    Sure Comfort Insulin Syringe 31G X 5/16\" 0.5 ML misc See Admin Instructions.      amoxicillin-clavulanate (AUGMENTIN) 875-125 MG per tablet Take 1 tablet by mouth 2 (Two) Times a Day. 14 tablet 0    fluticasone (FLONASE) 50 MCG/ACT nasal spray Administer 2 sprays into the nostril(s) as directed by provider Daily for 10 days. 15.8 mL 1    Hydrocod Rachid-Chlorphe Rachid ER (TUSSIONEX PENNKINETIC) 10-8 MG/5ML ER suspension Take 5 mL by mouth Every 12 (Twelve) Hours As Needed for Cough. 50 mL 0     No current facility-administered medications for this visit.       Objective     Objective     Physical Exam:  Physical Exam  Vitals and nursing note reviewed.   Constitutional:       Appearance: Normal appearance.   HENT:      Head: Normocephalic and atraumatic.      Right Ear: Ear canal normal.      Left Ear: Ear canal normal.      Ears:      Comments: +b/l bulging fluid behind ear drums     Nose: Congestion present.      Right Sinus: Maxillary sinus tenderness present.      Left Sinus: Maxillary sinus tenderness present.      Mouth/Throat:      Mouth: Mucous membranes are moist.      Pharynx: Postnasal drip present.   Eyes:      Pupils: Pupils are equal, round, and reactive to light.   Cardiovascular:      Rate and Rhythm: Normal rate and regular rhythm.      Heart sounds: Normal heart sounds.   Pulmonary:      Effort: Pulmonary effort is normal.      Breath sounds: Normal breath sounds.   Abdominal:      General: Bowel sounds are normal.      Palpations: Abdomen is soft.   Musculoskeletal:         General: Normal range of motion.      Cervical back: Normal range of motion.   Skin:     General: " Skin is warm and dry.   Neurological:      General: No focal deficit present.      Mental Status: She is alert and oriented to person, place, and time.   Psychiatric:         Mood and Affect: Mood normal.         Behavior: Behavior normal.         Vital Signs:   /64   Pulse 77   Temp 98.6 °F (37 °C) (Temporal)   Wt 97.3 kg (214 lb 9.6 oz)   SpO2 97%   BMI 43.32 kg/m²            PHQ-9 Score  PHQ-9 Total Score:      Lab Review  No visits with results within 3 Month(s) from this visit.   Latest known visit with results is:   Lab on 10/24/2024   Component Date Value Ref Range Status    Glucose 10/24/2024 88  65 - 99 mg/dL Final    BUN 10/24/2024 10  6 - 20 mg/dL Final    Creatinine 10/24/2024 0.73  0.57 - 1.00 mg/dL Final    Sodium 10/24/2024 138  136 - 145 mmol/L Final    Potassium 10/24/2024 4.2  3.5 - 5.2 mmol/L Final    Chloride 10/24/2024 104  98 - 107 mmol/L Final    CO2 10/24/2024 24.7  22.0 - 29.0 mmol/L Final    Calcium 10/24/2024 9.1  8.6 - 10.5 mg/dL Final    Total Protein 10/24/2024 6.6  6.0 - 8.5 g/dL Final    Albumin 10/24/2024 3.7  3.5 - 5.2 g/dL Final    ALT (SGPT) 10/24/2024 26  1 - 33 U/L Final    AST (SGOT) 10/24/2024 24  1 - 32 U/L Final    Alkaline Phosphatase 10/24/2024 59  39 - 117 U/L Final    Total Bilirubin 10/24/2024 0.3  0.0 - 1.2 mg/dL Final    Globulin 10/24/2024 2.9  gm/dL Final    A/G Ratio 10/24/2024 1.3  g/dL Final    BUN/Creatinine Ratio 10/24/2024 13.7  7.0 - 25.0 Final    Anion Gap 10/24/2024 9.3  5.0 - 15.0 mmol/L Final    eGFR 10/24/2024 104.1  >60.0 mL/min/1.73 Final    Hemoglobin A1C 10/24/2024 4.70 (L)  4.80 - 5.60 % Final    Total Cholesterol 10/24/2024 253 (H)  0 - 200 mg/dL Final    Triglycerides 10/24/2024 89  0 - 150 mg/dL Final    HDL Cholesterol 10/24/2024 49  40 - 60 mg/dL Final    LDL Cholesterol  10/24/2024 189 (H)  0 - 100 mg/dL Final    VLDL Cholesterol 10/24/2024 15  5 - 40 mg/dL Final    LDL/HDL Ratio 10/24/2024 3.80   Final    TSH 10/24/2024 1.250   0.270 - 4.200 uIU/mL Final    Vitamin B-12 10/24/2024 408  211 - 946 pg/mL Final    25 Hydroxy, Vitamin D 10/24/2024 24.6 (L)  30.0 - 100.0 ng/ml Final    FSH 10/24/2024 5.30  mIU/mL Final    LH 10/24/2024 16.20  mIU/mL Final    Estrogen 10/24/2024 567  pg/mL Final                             Prepubertal             < 40                           Female Cycle:                             1-10 Days         16 - 328                             11-20 Days        34 - 501                             21-30 Days        48 - 350                             Post-Menopausal   40 - 244    DHEA 10/24/2024 124  31 - 701 ng/dL Final    Prolactin 10/24/2024 13.70  4.79 - 23.30 ng/mL Final    Testosterone, Total 10/24/2024 32  4 - 50 ng/dL Final    Testosterone, Free 10/24/2024 1.2  0.0 - 4.2 pg/mL Final    WBC 10/24/2024 5.81  3.40 - 10.80 10*3/mm3 Final    RBC 10/24/2024 4.69  3.77 - 5.28 10*6/mm3 Final    Hemoglobin 10/24/2024 14.5  12.0 - 15.9 g/dL Final    Hematocrit 10/24/2024 42.6  34.0 - 46.6 % Final    MCV 10/24/2024 90.8  79.0 - 97.0 fL Final    MCH 10/24/2024 30.9  26.6 - 33.0 pg Final    MCHC 10/24/2024 34.0  31.5 - 35.7 g/dL Final    RDW 10/24/2024 13.2  12.3 - 15.4 % Final    RDW-SD 10/24/2024 43.3  37.0 - 54.0 fl Final    MPV 10/24/2024 11.3  6.0 - 12.0 fL Final    Platelets 10/24/2024 326  140 - 450 10*3/mm3 Final    Neutrophil % 10/24/2024 51.2  42.7 - 76.0 % Final    Lymphocyte % 10/24/2024 32.9  19.6 - 45.3 % Final    Monocyte % 10/24/2024 11.7  5.0 - 12.0 % Final    Eosinophil % 10/24/2024 3.3  0.3 - 6.2 % Final    Basophil % 10/24/2024 0.7  0.0 - 1.5 % Final    Immature Grans % 10/24/2024 0.2  0.0 - 0.5 % Final    Neutrophils, Absolute 10/24/2024 2.98  1.70 - 7.00 10*3/mm3 Final    Lymphocytes, Absolute 10/24/2024 1.91  0.70 - 3.10 10*3/mm3 Final    Monocytes, Absolute 10/24/2024 0.68  0.10 - 0.90 10*3/mm3 Final    Eosinophils, Absolute 10/24/2024 0.19  0.00 - 0.40 10*3/mm3 Final    Basophils, Absolute  10/24/2024 0.04  0.00 - 0.20 10*3/mm3 Final    Immature Grans, Absolute 10/24/2024 0.01  0.00 - 0.05 10*3/mm3 Final    nRBC 10/24/2024 0.0  0.0 - 0.2 /100 WBC Final        Radiology Results        Assessment / Plan         Assessment and Plan       Diagnoses and all orders for this visit:    1. Acute non-recurrent maxillary sinusitis (Primary)  Assessment & Plan:  Symptoms may have originated from a viral infection that subsequently evolved into a bacterial infection. A prescription for Augmentin has been issued to manage the infection. Additionally, Tussionex hydrocodone cough syrup has been prescribed to alleviate the cough. If she feels she develops a bronchitis in her lungs, she is advised to contact the office for a potential steroid pack prescription.    Orders:  -     amoxicillin-clavulanate (AUGMENTIN) 875-125 MG per tablet; Take 1 tablet by mouth 2 (Two) Times a Day.  Dispense: 14 tablet; Refill: 0  -     Hydrocod Rachid-Chlorphe Rachid ER (TUSSIONEX PENNKINETIC) 10-8 MG/5ML ER suspension; Take 5 mL by mouth Every 12 (Twelve) Hours As Needed for Cough.  Dispense: 50 mL; Refill: 0    2. Obesity, Class III, BMI 40-49.9 (morbid obesity)  -     Semaglutide-Weight Management 1 MG/0.5ML solution auto-injector; Inject 0.5 mL under the skin into the appropriate area as directed 1 (One) Time Per Week.  Dispense: 2 mL; Refill: 0                Health Maintenance  Health Maintenance:   Health Maintenance Due   Topic Date Due    PAP SMEAR  07/11/2019    COLORECTAL CANCER SCREENING  02/11/2025        Meds ordered during this visit  New Medications Ordered This Visit   Medications    amoxicillin-clavulanate (AUGMENTIN) 875-125 MG per tablet     Sig: Take 1 tablet by mouth 2 (Two) Times a Day.     Dispense:  14 tablet     Refill:  0    Hydrocod Rachid-Chlorphe Rachid ER (TUSSIONEX PENNKINETIC) 10-8 MG/5ML ER suspension     Sig: Take 5 mL by mouth Every 12 (Twelve) Hours As Needed for Cough.     Dispense:  50 mL     Refill:  0     Semaglutide-Weight Management 1 MG/0.5ML solution auto-injector     Sig: Inject 0.5 mL under the skin into the appropriate area as directed 1 (One) Time Per Week.     Dispense:  2 mL     Refill:  0     Please compound with b12       Meds stopped during this visit:  Medications Discontinued During This Encounter   Medication Reason    Semaglutide, 1 MG/DOSE, (OZEMPIC) 4 MG/3ML solution pen-injector *Therapy completed    cyanocobalamin (VITAMIN B-12) 2500 MCG tablet tablet *Therapy completed    Semaglutide-Weight Management 1 MG/0.5ML solution auto-injector Reorder        Patient was given instructions and counseling regarding her condition or for health maintenance advice. Please see specific information pulled into the AVS if appropriate.     Follow Up   No follow-ups on file.    María Ge DO  [facility] [place]     Dictated Utilizing Dragon Dictation: Part of this note may be an electronic transcription/translation of spoken language to printed text using the Dragon Dictation System.    Patient or patient representative verbalized consent for the use of Ambient Listening during the visit with  María Ge DO for chart documentation. 4/7/2025  16:24 EDT      This document has been electronically signed by María Ge DO   April 7, 2025 16:25 EDT

## 2025-04-07 NOTE — ASSESSMENT & PLAN NOTE
Symptoms may have originated from a viral infection that subsequently evolved into a bacterial infection. A prescription for Augmentin has been issued to manage the infection. Additionally, Tussionex hydrocodone cough syrup has been prescribed to alleviate the cough. If she feels she develops a bronchitis in her lungs, she is advised to contact the office for a potential steroid pack prescription.

## 2025-04-07 NOTE — TELEPHONE ENCOUNTER
Caller: [facility] [id number] - [place] [address] AT [facility] - [phone]  - [phone]    Relationship: Pharmacy    Best call back number: [phone]    Which medication are you concerned about: WEGOVY COMPOUND    Who prescribed you this medication: DR PATEL    What are your concerns: [facility] DOES NOT DO COMPOUNDING PRESCRIPTIONS

## 2025-04-30 DIAGNOSIS — E66.01 OBESITY, CLASS III, BMI 40-49.9 (MORBID OBESITY): ICD-10-CM

## 2025-05-01 ENCOUNTER — TELEPHONE (OUTPATIENT)
Dept: FAMILY MEDICINE CLINIC | Facility: CLINIC | Age: 45
End: 2025-05-01
Payer: COMMERCIAL

## 2025-05-01 NOTE — TELEPHONE ENCOUNTER
Caller: Eun Majano    Relationship: Self    Best call back number: [phone]     What form or medical record are you requesting: FILLED OUT COPY OF FORM FOR Semaglutide-Weight Management 1 MG/0.5ML solution auto-injector THAT [facility] SENT OVER    Who is requesting this form or medical record from you: [facility]    How would you like to receive the form or medical records (pick-up, mail, fax): FAX  [employer] HAS FAX INFO ON IT    Timeframe paperwork needed: ASAP    Additional notes: [facility] SENT OVER A BLANK VERSION OF THE DOCUMENT THE WEEK OF 4.28.25